# Patient Record
Sex: FEMALE | Race: WHITE | NOT HISPANIC OR LATINO | Employment: FULL TIME | URBAN - METROPOLITAN AREA
[De-identification: names, ages, dates, MRNs, and addresses within clinical notes are randomized per-mention and may not be internally consistent; named-entity substitution may affect disease eponyms.]

---

## 2017-01-23 ENCOUNTER — AMBULATORY - HEALTHEAST (OUTPATIENT)
Dept: FAMILY MEDICINE | Facility: CLINIC | Age: 37
End: 2017-01-23

## 2017-01-23 DIAGNOSIS — Z11.1 SCREENING FOR TUBERCULOSIS: ICD-10-CM

## 2017-01-24 ENCOUNTER — COMMUNICATION - HEALTHEAST (OUTPATIENT)
Dept: TELEHEALTH | Facility: CLINIC | Age: 37
End: 2017-01-24

## 2017-03-02 ENCOUNTER — COMMUNICATION - HEALTHEAST (OUTPATIENT)
Dept: TELEHEALTH | Facility: CLINIC | Age: 37
End: 2017-03-02

## 2017-03-02 ENCOUNTER — OFFICE VISIT - HEALTHEAST (OUTPATIENT)
Dept: FAMILY MEDICINE | Facility: CLINIC | Age: 37
End: 2017-03-02

## 2017-03-02 DIAGNOSIS — J40 BRONCHITIS: ICD-10-CM

## 2017-03-02 DIAGNOSIS — F17.200 SMOKER: ICD-10-CM

## 2017-03-02 ASSESSMENT — MIFFLIN-ST. JEOR: SCORE: 1992.65

## 2017-03-06 ENCOUNTER — COMMUNICATION - HEALTHEAST (OUTPATIENT)
Dept: FAMILY MEDICINE | Facility: CLINIC | Age: 37
End: 2017-03-06

## 2017-03-08 ENCOUNTER — OFFICE VISIT - HEALTHEAST (OUTPATIENT)
Dept: FAMILY MEDICINE | Facility: CLINIC | Age: 37
End: 2017-03-08

## 2017-03-08 ENCOUNTER — RECORDS - HEALTHEAST (OUTPATIENT)
Dept: ADMINISTRATIVE | Facility: OTHER | Age: 37
End: 2017-03-08

## 2017-03-08 ENCOUNTER — RECORDS - HEALTHEAST (OUTPATIENT)
Dept: GENERAL RADIOLOGY | Facility: CLINIC | Age: 37
End: 2017-03-08

## 2017-03-08 DIAGNOSIS — R05.9 COUGH: ICD-10-CM

## 2017-03-08 DIAGNOSIS — M54.9 DORSALGIA, UNSPECIFIED: ICD-10-CM

## 2017-03-08 DIAGNOSIS — M54.9 BACK PAIN: ICD-10-CM

## 2017-03-08 DIAGNOSIS — R31.9 HEMATURIA: ICD-10-CM

## 2017-03-08 LAB
CREAT SERPL-MCNC: 0.79 MG/DL (ref 0.6–1.1)
GFR SERPL CREATININE-BSD FRML MDRD: >60 ML/MIN/1.73M2

## 2017-03-08 ASSESSMENT — MIFFLIN-ST. JEOR: SCORE: 2015.33

## 2017-03-10 ENCOUNTER — COMMUNICATION - HEALTHEAST (OUTPATIENT)
Dept: FAMILY MEDICINE | Facility: CLINIC | Age: 37
End: 2017-03-10

## 2017-03-13 ENCOUNTER — OFFICE VISIT - HEALTHEAST (OUTPATIENT)
Dept: FAMILY MEDICINE | Facility: CLINIC | Age: 37
End: 2017-03-13

## 2017-03-13 DIAGNOSIS — R05.9 COUGH: ICD-10-CM

## 2017-03-14 ENCOUNTER — COMMUNICATION - HEALTHEAST (OUTPATIENT)
Dept: FAMILY MEDICINE | Facility: CLINIC | Age: 37
End: 2017-03-14

## 2017-03-16 ENCOUNTER — COMMUNICATION - HEALTHEAST (OUTPATIENT)
Dept: FAMILY MEDICINE | Facility: CLINIC | Age: 37
End: 2017-03-16

## 2017-03-16 ENCOUNTER — OFFICE VISIT - HEALTHEAST (OUTPATIENT)
Dept: FAMILY MEDICINE | Facility: CLINIC | Age: 37
End: 2017-03-16

## 2017-03-16 DIAGNOSIS — R31.9 BLOOD IN URINE: ICD-10-CM

## 2017-03-16 DIAGNOSIS — F17.200 NICOTINE DEPENDENCE: ICD-10-CM

## 2017-03-16 DIAGNOSIS — J20.9 ACUTE BRONCHITIS, UNSPECIFIED ORGANISM: ICD-10-CM

## 2017-03-16 ASSESSMENT — MIFFLIN-ST. JEOR: SCORE: 1997.18

## 2017-03-17 ENCOUNTER — COMMUNICATION - HEALTHEAST (OUTPATIENT)
Dept: FAMILY MEDICINE | Facility: CLINIC | Age: 37
End: 2017-03-17

## 2017-03-22 ENCOUNTER — RECORDS - HEALTHEAST (OUTPATIENT)
Dept: GENERAL RADIOLOGY | Facility: CLINIC | Age: 37
End: 2017-03-22

## 2017-03-22 ENCOUNTER — OFFICE VISIT - HEALTHEAST (OUTPATIENT)
Dept: FAMILY MEDICINE | Facility: CLINIC | Age: 37
End: 2017-03-22

## 2017-03-22 DIAGNOSIS — R05.9 COUGH: ICD-10-CM

## 2017-03-22 DIAGNOSIS — R53.83 FATIGUE: ICD-10-CM

## 2017-03-22 DIAGNOSIS — Z00.00 HEALTH CARE MAINTENANCE: ICD-10-CM

## 2017-03-22 DIAGNOSIS — M54.9 BACK PAIN: ICD-10-CM

## 2017-03-22 ASSESSMENT — MIFFLIN-ST. JEOR: SCORE: 2015.33

## 2017-03-23 ENCOUNTER — COMMUNICATION - HEALTHEAST (OUTPATIENT)
Dept: FAMILY MEDICINE | Facility: CLINIC | Age: 37
End: 2017-03-23

## 2017-03-23 ENCOUNTER — COMMUNICATION - HEALTHEAST (OUTPATIENT)
Dept: SCHEDULING | Facility: CLINIC | Age: 37
End: 2017-03-23

## 2017-03-28 ENCOUNTER — COMMUNICATION - HEALTHEAST (OUTPATIENT)
Dept: FAMILY MEDICINE | Facility: CLINIC | Age: 37
End: 2017-03-28

## 2017-03-30 ENCOUNTER — COMMUNICATION - HEALTHEAST (OUTPATIENT)
Dept: FAMILY MEDICINE | Facility: CLINIC | Age: 37
End: 2017-03-30

## 2017-04-08 ENCOUNTER — COMMUNICATION - HEALTHEAST (OUTPATIENT)
Dept: FAMILY MEDICINE | Facility: CLINIC | Age: 37
End: 2017-04-08

## 2017-04-10 ENCOUNTER — AMBULATORY - HEALTHEAST (OUTPATIENT)
Dept: FAMILY MEDICINE | Facility: CLINIC | Age: 37
End: 2017-04-10

## 2017-04-10 DIAGNOSIS — R93.89 ABNORMAL CHEST X-RAY: ICD-10-CM

## 2017-04-10 DIAGNOSIS — R05.9 COUGH: ICD-10-CM

## 2017-04-12 ENCOUNTER — AMBULATORY - HEALTHEAST (OUTPATIENT)
Dept: PULMONOLOGY | Facility: OTHER | Age: 37
End: 2017-04-12

## 2017-04-12 DIAGNOSIS — R05.9 COUGH: ICD-10-CM

## 2018-02-19 ENCOUNTER — COMMUNICATION - HEALTHEAST (OUTPATIENT)
Dept: FAMILY MEDICINE | Facility: CLINIC | Age: 38
End: 2018-02-19

## 2018-02-19 ENCOUNTER — OFFICE VISIT - HEALTHEAST (OUTPATIENT)
Dept: FAMILY MEDICINE | Facility: CLINIC | Age: 38
End: 2018-02-19

## 2018-02-19 DIAGNOSIS — G47.9 TROUBLE IN SLEEPING: ICD-10-CM

## 2018-02-19 DIAGNOSIS — F17.200 TOBACCO USE DISORDER: ICD-10-CM

## 2018-02-19 DIAGNOSIS — N89.8 VAGINAL IRRITATION: ICD-10-CM

## 2018-02-19 DIAGNOSIS — Z97.5 IUD (INTRAUTERINE DEVICE) IN PLACE: ICD-10-CM

## 2018-02-19 DIAGNOSIS — Z00.00 ANNUAL PHYSICAL EXAM: ICD-10-CM

## 2018-02-19 DIAGNOSIS — L29.9 ITCHY SKIN: ICD-10-CM

## 2018-02-19 DIAGNOSIS — E66.01 MORBID OBESITY (H): ICD-10-CM

## 2018-02-19 DIAGNOSIS — R53.83 FATIGUE: ICD-10-CM

## 2018-02-19 DIAGNOSIS — Z56.6 WORK STRESS: ICD-10-CM

## 2018-02-19 LAB
CHOLEST SERPL-MCNC: 116 MG/DL
CLUE CELLS: NORMAL
FASTING STATUS PATIENT QL REPORTED: YES
HBA1C MFR BLD: 5.4 % (ref 3.5–6)
HDLC SERPL-MCNC: 40 MG/DL
LDLC SERPL CALC-MCNC: 62 MG/DL
TRICHOMONAS, WET PREP: NORMAL
TRIGL SERPL-MCNC: 69 MG/DL
TSH SERPL DL<=0.005 MIU/L-ACNC: 2.25 UIU/ML (ref 0.3–5)
YEAST, WET PREP: NORMAL

## 2018-02-19 SDOH — HEALTH STABILITY - MENTAL HEALTH: OTHER PHYSICAL AND MENTAL STRAIN RELATED TO WORK: Z56.6

## 2018-02-19 ASSESSMENT — MIFFLIN-ST. JEOR: SCORE: 1996.61

## 2018-02-20 LAB
C TRACH DNA SPEC QL PROBE+SIG AMP: NEGATIVE
HPV SOURCE: NORMAL
HUMAN PAPILLOMA VIRUS 16 DNA: NEGATIVE
HUMAN PAPILLOMA VIRUS 18 DNA: NEGATIVE
HUMAN PAPILLOMA VIRUS FINAL DIAGNOSIS: NORMAL
HUMAN PAPILLOMA VIRUS OTHER HR: NEGATIVE
N GONORRHOEA DNA SPEC QL NAA+PROBE: NEGATIVE
SPECIMEN DESCRIPTION: NORMAL

## 2018-09-13 ENCOUNTER — COMMUNICATION - HEALTHEAST (OUTPATIENT)
Dept: TELEHEALTH | Facility: CLINIC | Age: 38
End: 2018-09-13

## 2018-09-13 ENCOUNTER — COMMUNICATION - HEALTHEAST (OUTPATIENT)
Dept: HEALTH INFORMATION MANAGEMENT | Facility: CLINIC | Age: 38
End: 2018-09-13

## 2018-09-17 ENCOUNTER — OFFICE VISIT - HEALTHEAST (OUTPATIENT)
Dept: FAMILY MEDICINE | Facility: CLINIC | Age: 38
End: 2018-09-17

## 2018-09-17 DIAGNOSIS — Z01.818 TUBAL LIGATION EVALUATION: ICD-10-CM

## 2018-09-17 DIAGNOSIS — Z01.818 PRE-OP EXAM: ICD-10-CM

## 2019-04-12 ENCOUNTER — OFFICE VISIT - HEALTHEAST (OUTPATIENT)
Dept: FAMILY MEDICINE | Facility: CLINIC | Age: 39
End: 2019-04-12

## 2019-04-12 ENCOUNTER — HOSPITAL ENCOUNTER (OUTPATIENT)
Dept: LAB | Age: 39
Setting detail: SPECIMEN
Discharge: HOME | End: 2019-04-12

## 2019-04-12 DIAGNOSIS — R00.2 PALPITATIONS: ICD-10-CM

## 2019-04-12 DIAGNOSIS — G47.19 EXCESSIVE DAYTIME SLEEPINESS: ICD-10-CM

## 2019-04-12 LAB
ALBUMIN SERPL-MCNC: 3.9 G/DL (ref 3.5–5)
ALP SERPL-CCNC: 82 U/L (ref 45–120)
ALT SERPL W P-5'-P-CCNC: 12 U/L (ref 0–45)
ANION GAP SERPL CALCULATED.3IONS-SCNC: 9 MMOL/L (ref 5–18)
AST SERPL W P-5'-P-CCNC: 11 U/L (ref 0–40)
BILIRUB SERPL-MCNC: 0.2 MG/DL (ref 0–1)
BUN SERPL-MCNC: 8 MG/DL (ref 8–22)
CALCIUM SERPL-MCNC: 9.2 MG/DL (ref 8.5–10.5)
CHLORIDE BLD-SCNC: 109 MMOL/L (ref 98–107)
CO2 SERPL-SCNC: 23 MMOL/L (ref 22–31)
CREAT SERPL-MCNC: 0.72 MG/DL (ref 0.6–1.1)
GFR SERPL CREATININE-BSD FRML MDRD: >60 ML/MIN/1.73M2
GLUCOSE BLD-MCNC: 84 MG/DL (ref 70–125)
IRON SATN MFR SERPL: 22 % (ref 20–50)
IRON SERPL-MCNC: 77 UG/DL (ref 42–175)
MAGNESIUM SERPL-MCNC: 2.3 MG/DL (ref 1.8–2.6)
POTASSIUM BLD-SCNC: 4.1 MMOL/L (ref 3.5–5)
PROT SERPL-MCNC: 7 G/DL (ref 6–8)
SODIUM SERPL-SCNC: 141 MMOL/L (ref 136–145)
TIBC SERPL-MCNC: 356 UG/DL (ref 313–563)
TRANSFERRIN SERPL-MCNC: 285 MG/DL (ref 212–360)
TSH SERPL DL<=0.005 MIU/L-ACNC: 1.61 UIU/ML (ref 0.3–5)
VIT B12 SERPL-MCNC: 256 PG/ML (ref 213–816)

## 2019-04-15 LAB
25(OH)D3 SERPL-MCNC: 14.6 NG/ML (ref 30–80)
25(OH)D3 SERPL-MCNC: 14.6 NG/ML (ref 30–80)
BASOPHILS # BLD AUTO: 0.1 THOU/UL (ref 0–0.2)
BASOPHILS NFR BLD AUTO: 1 % (ref 0–2)
EOSINOPHIL # BLD AUTO: 0.2 THOU/UL (ref 0–0.4)
EOSINOPHIL NFR BLD AUTO: 3 % (ref 0–6)
ERYTHROCYTE [DISTWIDTH] IN BLOOD BY AUTOMATED COUNT: 11.9 % (ref 11–14.5)
HCT VFR BLD AUTO: 43 % (ref 35–47)
HGB BLD-MCNC: 14.4 G/DL (ref 12–16)
LYMPHOCYTES # BLD AUTO: 2.5 THOU/UL (ref 0.8–4.4)
LYMPHOCYTES NFR BLD AUTO: 29 % (ref 20–40)
MCH RBC QN AUTO: 29.1 PG (ref 27–34)
MCHC RBC AUTO-ENTMCNC: 33.5 G/DL (ref 32–36)
MCV RBC AUTO: 87 FL (ref 80–100)
MONOCYTES # BLD AUTO: 0.7 THOU/UL (ref 0–0.9)
MONOCYTES NFR BLD AUTO: 8 % (ref 2–10)
NEUTROPHILS # BLD AUTO: 5.2 THOU/UL (ref 2–7.7)
NEUTROPHILS NFR BLD AUTO: 60 % (ref 50–70)
PLATELET # BLD AUTO: 253 THOU/UL (ref 140–440)
PMV BLD AUTO: 7.9 FL (ref 7–10)
RBC # BLD AUTO: 4.97 MILL/UL (ref 3.8–5.4)
WBC: 8.7 THOU/UL (ref 4–11)

## 2019-04-17 ENCOUNTER — HOSPITAL ENCOUNTER (OUTPATIENT)
Dept: CARDIOLOGY | Facility: CLINIC | Age: 39
Discharge: HOME | End: 2019-04-17
Attending: FAMILY MEDICINE

## 2019-04-17 DIAGNOSIS — R00.2 PALPITATIONS: ICD-10-CM

## 2019-09-11 ENCOUNTER — RECORDS - HEALTHEAST (OUTPATIENT)
Dept: ADMINISTRATIVE | Facility: OTHER | Age: 39
End: 2019-09-11

## 2019-09-28 ENCOUNTER — OFFICE VISIT - HEALTHEAST (OUTPATIENT)
Dept: FAMILY MEDICINE | Facility: CLINIC | Age: 39
End: 2019-09-28

## 2019-09-28 DIAGNOSIS — J01.00 ACUTE NON-RECURRENT MAXILLARY SINUSITIS: ICD-10-CM

## 2019-09-28 ASSESSMENT — MIFFLIN-ST. JEOR: SCORE: 1997.18

## 2019-10-04 ASSESSMENT — MIFFLIN-ST. JEOR
SCORE: 1997.18
SCORE: 1997.18

## 2019-10-08 ENCOUNTER — OFFICE VISIT - HEALTHEAST (OUTPATIENT)
Dept: FAMILY MEDICINE | Facility: CLINIC | Age: 39
End: 2019-10-08

## 2019-10-08 DIAGNOSIS — Z01.818 PREOP EXAMINATION: ICD-10-CM

## 2019-10-08 DIAGNOSIS — Z30.09 ENCOUNTER FOR OTHER GENERAL COUNSELING OR ADVICE ON CONTRACEPTION: ICD-10-CM

## 2019-10-08 DIAGNOSIS — E66.01 MORBID OBESITY (H): ICD-10-CM

## 2019-10-08 LAB
HCG SERPL-ACNC: <2 MLU/ML (ref 0–4)
HGB BLD-MCNC: 13.9 G/DL (ref 12–16)

## 2019-10-08 ASSESSMENT — MIFFLIN-ST. JEOR: SCORE: 1994.01

## 2019-10-09 ENCOUNTER — ANESTHESIA - HEALTHEAST (OUTPATIENT)
Dept: SURGERY | Facility: AMBULATORY SURGERY CENTER | Age: 39
End: 2019-10-09

## 2019-10-10 ENCOUNTER — SURGERY - HEALTHEAST (OUTPATIENT)
Dept: SURGERY | Facility: AMBULATORY SURGERY CENTER | Age: 39
End: 2019-10-10

## 2019-10-10 ENCOUNTER — HOSPITAL ENCOUNTER (OUTPATIENT)
Dept: SURGERY | Facility: AMBULATORY SURGERY CENTER | Age: 39
Discharge: HOME | End: 2019-10-10
Attending: OBSTETRICS & GYNECOLOGY | Admitting: OBSTETRICS & GYNECOLOGY

## 2019-10-10 DIAGNOSIS — Z30.2 ENCOUNTER FOR STERILIZATION: ICD-10-CM

## 2020-01-05 ENCOUNTER — OFFICE VISIT - HEALTHEAST (OUTPATIENT)
Dept: FAMILY MEDICINE | Facility: CLINIC | Age: 40
End: 2020-01-05

## 2020-01-05 DIAGNOSIS — B96.89 ACUTE BACTERIAL SINUSITIS: ICD-10-CM

## 2020-01-05 DIAGNOSIS — R05.9 COUGH: ICD-10-CM

## 2020-01-05 DIAGNOSIS — J01.90 ACUTE BACTERIAL SINUSITIS: ICD-10-CM

## 2020-04-03 ENCOUNTER — COMMUNICATION - HEALTHEAST (OUTPATIENT)
Dept: FAMILY MEDICINE | Facility: CLINIC | Age: 40
End: 2020-04-03

## 2020-04-03 DIAGNOSIS — J01.00 ACUTE NON-RECURRENT MAXILLARY SINUSITIS: ICD-10-CM

## 2020-09-11 ENCOUNTER — COMMUNICATION - HEALTHEAST (OUTPATIENT)
Dept: FAMILY MEDICINE | Facility: CLINIC | Age: 40
End: 2020-09-11

## 2020-09-11 DIAGNOSIS — J01.00 ACUTE NON-RECURRENT MAXILLARY SINUSITIS: ICD-10-CM

## 2020-11-05 ENCOUNTER — COMMUNICATION - HEALTHEAST (OUTPATIENT)
Dept: SCHEDULING | Facility: CLINIC | Age: 40
End: 2020-11-05

## 2020-11-09 ENCOUNTER — OFFICE VISIT - HEALTHEAST (OUTPATIENT)
Dept: FAMILY MEDICINE | Facility: CLINIC | Age: 40
End: 2020-11-09

## 2020-11-09 DIAGNOSIS — J01.00 ACUTE NON-RECURRENT MAXILLARY SINUSITIS: ICD-10-CM

## 2020-11-11 ENCOUNTER — COMMUNICATION - HEALTHEAST (OUTPATIENT)
Dept: FAMILY MEDICINE | Facility: CLINIC | Age: 40
End: 2020-11-11

## 2020-12-16 ENCOUNTER — COMMUNICATION - HEALTHEAST (OUTPATIENT)
Dept: SCHEDULING | Facility: CLINIC | Age: 40
End: 2020-12-16

## 2020-12-22 ENCOUNTER — OFFICE VISIT - HEALTHEAST (OUTPATIENT)
Dept: FAMILY MEDICINE | Facility: CLINIC | Age: 40
End: 2020-12-22

## 2020-12-22 DIAGNOSIS — M23.92 INTERNAL DERANGEMENT OF KNEE, LEFT: ICD-10-CM

## 2020-12-22 ASSESSMENT — MIFFLIN-ST. JEOR: SCORE: 1993.18

## 2020-12-28 ENCOUNTER — RECORDS - HEALTHEAST (OUTPATIENT)
Dept: ADMINISTRATIVE | Facility: OTHER | Age: 40
End: 2020-12-28

## 2021-05-27 NOTE — PROGRESS NOTES
ASSESSMENT/PLAN:  1. Palpitations  HM1(CBC and Differential)    Iron and Transferrin Iron Binding Capacity    Thyroid Stimulating Hormone (TSH)    Comprehensive Metabolic Panel    Vitamin D, Total (25-Hydroxy)    Vitamin B12    Ambulatory referral to Sleep Medicine    Holter Monitor    HM1 (CBC with Diff)    Magnesium   2. Excessive daytime sleepiness  Ambulatory referral to Sleep Medicine       This is a 39 yo female with   1.  Palpitations - there would be a broad differential for this - no acute symptoms that would require hospitalization at this time.  She may just be showing signs of stress/anxiety with increased work load/responsibility.  This may be cardiac in nature - or may represent underlying anemia, thyroid disease.  Will check labs as noted.  Ordered a Holter monitor as well  2.  Excessive daytime sleepiness - may contribute to #1 - will refer to Sleep Medicine for evaluation.   No follow-ups on file.      There are no discontinued medications.  There are no Patient Instructions on file for this visit.    Chief Complaint:  Chief Complaint   Patient presents with     Fluttering in chest and back     comes and goes x 4 days        HPI:   Sejal Ball is a 38 y.o. 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 been doing 2 months , but will be getting a new agency        PMH:   Patient Active Problem List    Diagnosis Date Noted     Mirena IUD (expires 10/2018) 02/20/2018     Female infertility associated with anovulation 02/19/2018     Herpes simplex type 2 infection 02/19/2018     Tobacco use disorder 02/19/2018     Morbid obesity (H) 02/19/2018     Trouble in sleeping 02/19/2018     Itchy skin - right breast 02/19/2018     GA (granuloma annulare) 08/11/2011     Past Medical History:   Diagnosis Date     Anovulation      Obesity      Tobacco abuse      Past Surgical History:   Procedure Laterality Date     CHOLECYSTECTOMY        Social History     Socioeconomic History     Marital status: Single     Spouse name: Not on file     Number of children: Not on file     Years of education: Not on file     Highest education level: Not on file   Occupational History     Not on file   Social Needs     Financial resource strain: Not on file     Food insecurity:     Worry: Not on file     Inability: Not on file     Transportation needs:     Medical: Not on file     Non-medical: Not on file   Tobacco Use     Smoking status: Current Every Day Smoker     Packs/day: 1.00     Years: 15.00     Pack years: 15.00     Types: Cigarettes     Smokeless tobacco: Never Used     Tobacco comment: half to almost a pack a day   Substance and Sexual Activity     Alcohol use: Not on file     Drug use: Not on file     Sexual activity: Not on file     Comment: Mirena   Lifestyle     Physical activity:     Days per week: Not on file     Minutes per session: Not on file     Stress: Not on file   Relationships     Social connections:     Talks on phone: Not on file     Gets together: Not on file     Attends Holiness service: Not on file     Active member of club or organization: Not on file     Attends meetings of clubs or organizations: Not on file     Relationship status: Not on file     Intimate partner violence:     Fear of current or ex partner: Not on file     Emotionally abused: Not on file     Physically abused: Not on file     Forced sexual activity: Not on file   Other Topics Concern     Not on file   Social History Narrative     Not on file     Family History   Problem Relation Age of Onset     Heart disease Mother      Prostate cancer Father      Hypertension Father        Meds:    Current Outpatient Medications:      levonorgestrel (MIRENA) 20 mcg/24 hr (5 years) IUD, 1 each by Intrauterine route once., Disp: , Rfl:      naproxen (NAPROSYN) 500 MG tablet, Take 500 mg by mouth., Disp: , Rfl:     Allergies:  Allergies   Allergen Reactions     [place] Oil Other  (See Comments)     Tongue tingling     Black [place] Hives     Other nuts do not bother patient     Penicillins Hives       ROS:  Pertinent positives as noted in HPI; otherwise 12 point ROS negative.      Physical Exam:  EXAM:  /78 (Patient Site: Right Arm, Patient Position: Sitting, Cuff Size: Adult Large)   Pulse 84   Temp 98.2  F (36.8  C) (Oral)   Resp 20   Wt (!) 282 lb 3 oz (128 kg)   LMP 04/08/2019 (Exact Date)   SpO2 98%   Breastfeeding? No   BMI 45.55 kg/m     Gen:  NAD, appears well, well-hydrated  HEENT:  TMs nl, oropharynx benign, nasal mucosa nl, conjunctiva clear  Neck:  Supple, no adenopathy, no thyromegaly, no carotid bruits, no JVD  Lungs:  Clear to auscultation bilaterally  Cor:  RRR no murmur  Abd:  Soft, nontender, BS+, no masses, no guarding or rebound, no HSM  Extr:  Neg.  Neuro:  No asymmetry  Skin:  Warm/dry        Results:  Results for orders placed or performed in visit on 04/12/19   Iron and Transferrin Iron Binding Capacity   Result Value Ref Range    Iron 77 42 - 175 ug/dL    Transferrin 285 212 - 360 mg/dL    Transferrin Saturation, Calculated 22 20 - 50 %    Transferrin IBC, Calculated 356 313 - 563 ug/dL   Thyroid Stimulating Hormone (TSH)   Result Value Ref Range    TSH 1.61 0.30 - 5.00 uIU/mL   Comprehensive Metabolic Panel   Result Value Ref Range    Sodium 141 136 - 145 mmol/L    Potassium 4.1 3.5 - 5.0 mmol/L    Chloride 109 (H) 98 - 107 mmol/L    CO2 23 22 - 31 mmol/L    Anion Gap, Calculation 9 5 - 18 mmol/L    Glucose 84 70 - 125 mg/dL    BUN 8 8 - 22 mg/dL    Creatinine 0.72 0.60 - 1.10 mg/dL    GFR MDRD Af Amer >60 >60 mL/min/1.73m2    GFR MDRD Non Af Amer >60 >60 mL/min/1.73m2    Bilirubin, Total 0.2 0.0 - 1.0 mg/dL    Calcium 9.2 8.5 - 10.5 mg/dL    Protein, Total 7.0 6.0 - 8.0 g/dL    Albumin 3.9 3.5 - 5.0 g/dL    Alkaline Phosphatase 82 45 - 120 U/L    AST 11 0 - 40 U/L    ALT 12 0 - 45 U/L   Vitamin D, Total (25-Hydroxy)   Result Value Ref Range    Vitamin  D, Total (25-Hydroxy) 14.6 (L) 30.0 - 80.0 ng/mL   Vitamin B12   Result Value Ref Range    Vitamin B-12 256 213 - 816 pg/mL   HM1 (CBC with Diff)   Result Value Ref Range    WBC 8.7 4.0 - 11.0 thou/uL    RBC 4.97 3.80 - 5.40 mill/uL    Hemoglobin 14.4 12.0 - 16.0 g/dL    Hematocrit 43.0 35.0 - 47.0 %    MCV 87 80 - 100 fL    MCH 29.1 27.0 - 34.0 pg    MCHC 33.5 32.0 - 36.0 g/dL    RDW 11.9 11.0 - 14.5 %    Platelets 253 140 - 440 thou/uL    MPV 7.9 7.0 - 10.0 fL    Neutrophils % 60 50 - 70 %    Lymphocytes % 29 20 - 40 %    Monocytes % 8 2 - 10 %    Eosinophils % 3 0 - 6 %    Basophils % 1 0 - 2 %    Neutrophils Absolute 5.2 2.0 - 7.7 thou/uL    Lymphocytes Absolute 2.5 0.8 - 4.4 thou/uL    Monocytes Absolute 0.7 0.0 - 0.9 thou/uL    Eosinophils Absolute 0.2 0.0 - 0.4 thou/uL    Basophils Absolute 0.1 0.0 - 0.2 thou/uL   Magnesium   Result Value Ref Range    Magnesium 2.3 1.8 - 2.6 mg/dL

## 2021-05-30 VITALS — WEIGHT: 290 LBS | BODY MASS INDEX: 46.61 KG/M2 | HEIGHT: 66 IN

## 2021-05-30 VITALS — WEIGHT: 290 LBS | BODY MASS INDEX: 46.81 KG/M2

## 2021-05-30 VITALS — BODY MASS INDEX: 47.09 KG/M2 | HEIGHT: 66 IN | WEIGHT: 293 LBS

## 2021-05-30 VITALS — HEIGHT: 66 IN | WEIGHT: 293 LBS | BODY MASS INDEX: 47.09 KG/M2

## 2021-05-30 VITALS — BODY MASS INDEX: 46.45 KG/M2 | HEIGHT: 66 IN | WEIGHT: 289 LBS

## 2021-06-01 VITALS — HEIGHT: 66 IN | WEIGHT: 289.88 LBS | BODY MASS INDEX: 46.59 KG/M2

## 2021-06-01 NOTE — PROGRESS NOTES
Chief Complaint   Patient presents with     Cough     vomitted yesterday, phlegm. X1 week     Sinus Problem     pressure        HPI:  Sejal Ball is a 39 y.o. female who presents today complaining of cough and sinus congestion x 1 week. She has been taking Ibuprofen for HA.  Her headache has been secondary to cough.  She vomited once yesterday secondary to coughing.  The vomit was all mucus.  Her nasal congestion mucus and productive cough has been yellow sputum.  She denies any wheezing, ear pain, fever, or abdominal pain.  She has had maxillary sinus pressure bilaterally for the past couple of days.  She was experiencing chills and sweats last night, but she did not measure to see if she had a fever.  She has not been taking any cough suppressants for her cough.    History obtained from the patient.    Problem List:  2018-02: Mirena IUD (expires 10/2018)  2018-02: Female infertility associated with anovulation  2018-02: Herpes simplex type 2 infection  2018-02: Tobacco use disorder  2018-02: Morbid obesity (H)  2018-02: Trouble in sleeping  2018-02: Itchy skin - right breast  2011-08: GA (granuloma annulare)      Past Medical History:   Diagnosis Date     Anovulation      Obesity      Tobacco abuse        Social History     Tobacco Use     Smoking status: Current Every Day Smoker     Packs/day: 1.00     Years: 15.00     Pack years: 15.00     Types: Cigarettes     Smokeless tobacco: Never Used     Tobacco comment: half to almost a pack a day   Substance Use Topics     Alcohol use: Not on file       Review of Systems   Constitutional: Positive for chills and diaphoresis. Negative for fever.   HENT: Positive for congestion, rhinorrhea, sinus pressure and sinus pain. Negative for ear pain.    Respiratory: Positive for cough. Negative for wheezing.    Gastrointestinal: Positive for vomiting (once yesterday, secondary from coughing). Negative for abdominal pain.   Neurological: Positive for headaches (secondary to  "cough).       Vitals:    09/28/19 1309   BP: 137/83   Patient Site: Right Arm   Patient Position: Sitting   Cuff Size: Adult Large   Pulse: 86   Resp: 20   Temp: 98.3  F (36.8  C)   TempSrc: Oral   SpO2: 99%   Weight: (!) 290 lb (131.5 kg)   Height: 5' 6\" (1.676 m)       Physical Exam  Constitutional:       General: She is not in acute distress.     Appearance: She is well-developed. She is not diaphoretic.   HENT:      Head: Normocephalic and atraumatic.      Right Ear: Tympanic membrane, ear canal and external ear normal.      Left Ear: Tympanic membrane, ear canal and external ear normal.      Nose:      Right Sinus: Maxillary sinus tenderness present. No frontal sinus tenderness.      Left Sinus: Maxillary sinus tenderness present. No frontal sinus tenderness.      Mouth/Throat:      Mouth: Mucous membranes are moist.   Eyes:      General:         Right eye: No discharge.         Left eye: No discharge.      Conjunctiva/sclera: Conjunctivae normal.   Cardiovascular:      Rate and Rhythm: Normal rate and regular rhythm.      Heart sounds: Normal heart sounds.   Pulmonary:      Effort: Pulmonary effort is normal. No respiratory distress.      Breath sounds: Normal breath sounds. No wheezing.   Psychiatric:         Behavior: Behavior normal.         Thought Content: Thought content normal.         Judgement: Judgment normal.           Clinical Decision Making:  Given the patient's increased productivity and purulence of her nasal discharge and correlation with maxillary sinus pain I suspect the patient may have bacterial sinusitis.  Patient was started on doxycycline for treatment.  Suspect that she also may have bronchitis as she is having any coughing spells.  Patient was treated with albuterol and benzonatate.  At the end of the encounter, I discussed results, diagnosis, medications. Discussed red flags for immediate return to clinic/ER, as well as indications for follow up if no improvement. Patient understood " and agreed to plan. Patient was stable for discharge.    1. Acute non-recurrent maxillary sinusitis  doxycycline (MONODOX) 100 MG capsule    benzonatate (TESSALON) 200 MG capsule    albuterol (PROAIR HFA;PROVENTIL HFA;VENTOLIN HFA) 90 mcg/actuation inhaler         Patient Instructions   1.  Take antibiotic according to bottle instructions with food to avoid stomach upset.  If you are prone to stomach upset with antibiotics, I recommend adding a probiotic to this regimen.  Culturelle is a trusted brand.  2.  I recommend using Mucinex to help thin mucus secretions.  Adding a saline nasal spray can also be helpful with clearing sinus congestion.  3.  Take Advil or Tylenol as needed for pain or fever control.  4.  Follow-up if you not having any improvement in your symptoms over the next 5 days.

## 2021-06-01 NOTE — PATIENT INSTRUCTIONS - HE
1.  Take antibiotic according to bottle instructions with food to avoid stomach upset.  If you are prone to stomach upset with antibiotics, I recommend adding a probiotic to this regimen.  Culturelle is a trusted brand.  2.  I recommend using Mucinex to help thin mucus secretions.  Adding a saline nasal spray can also be helpful with clearing sinus congestion.  3.  Take Advil or Tylenol as needed for pain or fever control.  4.  Follow-up if you not having any improvement in your symptoms over the next 5 days.

## 2021-06-02 VITALS — WEIGHT: 282.12 LBS | BODY MASS INDEX: 45.54 KG/M2

## 2021-06-02 NOTE — H&P
Updated H&P    History and Physical Update    I have examined the patient and reviewed the history and physical that is present on this chart. The changes in the patient's history and physical condition are as follows: None      Angela Means

## 2021-06-02 NOTE — PROGRESS NOTES
Preoperative Exam    Scheduled Procedure: laparoscopic bilateral salpingectomy (tubal ligation)  Surgery Date:  10/10/19  Surgery Location: [facility], fax [phone]    Surgeon:  Dr Means    Assessment/Plan:     1. Preop examination  Beta-hCG, Quantitative    Hemoglobin   2. Encounter for other general counseling or advice on contraception     3. Morbid obesity (H)       This is a 40 yo female here for preop assessment prior to planned tubal ligation.  She has had previous Mirena IUD and reports irregular heavy bleeding with this.  Has opted for surgical method of sterilization.  Reviewed labs - OK for surgery.  Has underlying risk factor of morbid obesity - discussed.        Surgical Procedure Risk: Low (reported cardiac risk generally < 1%)  Have you had prior anesthesia?: Yes  Have you or any family members had a previous anesthesia reaction:  Yes: needs inhaler  Do you or any family members have a history of a clotting or bleeding disorder?: No  Cardiac Risk Assessment: no increased risk for major cardiac complications    APPROVAL GIVEN to proceed with proposed procedure, without further diagnostic evaluation    Please Note:  no special considerations.    Functional Status: Independent  Patient plans to recover at home with family.     Subjective:      Sejal Ball is a 39 y.o. female who presents for a preoperative consultation.      Desires tubal ligation -   Has had Mirena - lighter periods, sometimes 7 days total ;       All other systems reviewed and are negative, other than those listed in the HPI.    Pertinent History  Do you have difficulty breathing or chest pain after walking up a flight of stairs: No  History of obstructive sleep apnea: No  Steroid use in the last 6 months: No  Frequent Aspirin/NSAID use: No  Prior Blood Transfusion: No  Prior Blood Transfusion Reaction: No  If for some reason prior to, during or after the procedure, if it is medically indicated, would you be  willing to have a blood transfusion?:  There is no transfusion refusal.    Current Outpatient Medications   Medication Sig Dispense Refill     albuterol (PROAIR HFA;PROVENTIL HFA;VENTOLIN HFA) 90 mcg/actuation inhaler Inhale 2 puffs every 6 (six) hours as needed for wheezing. 1 each 0     benzonatate (TESSALON) 200 MG capsule Take 1 capsule (200 mg total) by mouth 3 (three) times a day as needed. 21 capsule 0     doxycycline (MONODOX) 100 MG capsule Take 1 capsule (100 mg total) by mouth 2 (two) times a day for 10 days. 20 capsule 0     IBUPROFEN ORAL Take by mouth.       levonorgestrel (MIRENA) 20 mcg/24 hr (5 years) IUD 1 each by Intrauterine route once.       naproxen (NAPROSYN) 500 MG tablet Take 500 mg by mouth.       No current facility-administered medications for this visit.         Allergies   Allergen Reactions     [place] Oil Other (See Comments)     Tongue tingling     Black [place] Hives     Other nuts do not bother patient     Penicillins Hives       Patient Active Problem List   Diagnosis     Female infertility associated with anovulation     GA (granuloma annulare)     Herpes simplex type 2 infection     Tobacco use disorder     Morbid obesity (H)     Trouble in sleeping     Itchy skin - right breast     Mirena IUD (expires 10/2018)     Contraception management       Past Medical History:   Diagnosis Date     Anovulation      History of anesthesia complications     Some gasping after gallbaldder surgery     Obesity      Tobacco abuse        Past Surgical History:   Procedure Laterality Date     CHOLECYSTECTOMY         Social History     Socioeconomic History     Marital status: Single     Spouse name: Not on file     Number of children: Not on file     Years of education: Not on file     Highest education level: Not on file   Occupational History     Not on file   Social Needs     Financial resource strain: Not on file     Food insecurity:     Worry: Not on file     Inability: Not on file      "Transportation needs:     Medical: Not on file     Non-medical: Not on file   Tobacco Use     Smoking status: Current Every Day Smoker     Packs/day: 1.00     Years: 15.00     Pack years: 15.00     Types: Cigarettes     Smokeless tobacco: Never Used     Tobacco comment: half to almost a pack a day   Substance and Sexual Activity     Alcohol use: Never     Frequency: Never     Drug use: Never     Sexual activity: Not on file     Comment: Mirena   Lifestyle     Physical activity:     Days per week: Not on file     Minutes per session: Not on file     Stress: Not on file   Relationships     Social connections:     Talks on phone: Not on file     Gets together: Not on file     Attends Religion service: Not on file     Active member of club or organization: Not on file     Attends meetings of clubs or organizations: Not on file     Relationship status: Not on file     Intimate partner violence:     Fear of current or ex partner: Not on file     Emotionally abused: Not on file     Physically abused: Not on file     Forced sexual activity: Not on file   Other Topics Concern     Not on file   Social History Narrative     Not on file       Patient Care Team:  Annie Vazquez MD as PCP - General (Family Medicine)  Annie Vazquez MD as Assigned PCP          Objective:     Vitals:    10/08/19 1625   BP: 110/60   Pulse: 77   Resp: 16   Temp: 98.1  F (36.7  C)   TempSrc: Oral   Weight: (!) 289 lb 4.8 oz (131.2 kg)   Height: 5' 6\" (1.676 m)   LMP: 09/22/2019         Physical Exam:  Physical Exam   EXAM:  /60 (Patient Site: Right Arm, Patient Position: Sitting, Cuff Size: Adult Large)   Pulse 77   Temp 98.1  F (36.7  C) (Oral)   Resp 16   Ht 5' 6\" (1.676 m)   Wt (!) 289 lb 4.8 oz (131.2 kg)   LMP 09/22/2019   Breastfeeding? No   BMI 46.69 kg/m     Gen:  NAD, appears well, well-hydrated, obese  HEENT:  TMs nl, oropharynx benign, nasal mucosa nl, conjunctiva clear  Neck:  Supple, no " adenopathy, no thyromegaly, no carotid bruits, no JVD  Lungs:  Clear to auscultation bilaterally  Cor:  RRR no murmur  Abd:  Soft, nontender, BS+, no masses, no guarding or rebound, no HSM  Extr:  Neg.  Neuro:  No asymmetry  Skin:  Warm/dry        There are no Patient Instructions on file for this visit.        Labs:  Recent Results (from the past 24 hour(s))   Hemoglobin    Collection Time: 10/08/19  5:05 PM   Result Value Ref Range    Hemoglobin 13.9 12.0 - 16.0 g/dL     Pregnancy test - pending 10/8/19    Immunization History   Administered Date(s) Administered     DTP 1980, 1980, 1980, 1980, 11/01/1981     Influenza, inj, historic,unspecified 10/11/2010, 10/10/2011, 10/19/2012, 10/17/2013, 10/13/2014, 10/11/2015, 10/25/2016     Influenza,inj,MDCK,PF,Quad >4yrs 10/09/2017     MMR 10/24/1981, 08/19/1992     OPV,Trivalent,Historic(3126-1521 only) 1980, 1980, 11/01/1981, 08/01/1985     Tdap 07/11/2009           Electronically signed by Annie Vazquez MD 10/08/19 4:17 PM

## 2021-06-02 NOTE — OP NOTE
Operative Note    Name:  Sejal Ball  Location: [facility]  Procedure Date:  10/10/2019  PCP:  Annie Vazquez MD      Laparoscopic bilateral salpingectomy, left ovarian cystectomy, lysis of adhesions    Pre-Procedure Diagnosis:  Encounter for sterilization [Z30.2]     Post-Procedure Diagnosis:    Same as preop with left ovarian cyst    Surgeon(s):  Angela Means MD    No Physician Assistant or First Assist has been documented in procedure    Anesthesia Type:  General    Findings:  Left ovarian cyst with adhesions to anterior abdominal wall (approx 3cm)    Complications:    None    Specimens:    ID Type Source Tests Collected by Time   A :  Tissue Fallopian Tube, Right SURGICAL PATHOLOGY EXAM Angela Means MD 10/10/2019 1030   B :  Tissue Fallopian Tube, Left SURGICAL PATHOLOGY EXAM Angela Means MD 10/10/2019 1030   D : CYST WALL Tissue Ovary, Cyst, Left SURGICAL PATHOLOGY EXAM Angela Means MD 10/10/2019 1036          Lines, Drains, Airways:  Urethral Catheter Non-latex 16 Fr. (Active)       Implants:  * No implants in log *    Estimated Blood Loss:   20ml    DESCRIPTION OF THE PROCEDURE:   The patient was taken to the operating room in stable condition. She was prepped and draped in the normal sterile fashion for a vaginal and abdominal procedure.  A timeout was performed.    A Ochoa catheter was placed.  A sterile bivalve speculum was placed in the vagina.  A single-tooth tenaculum was used to grasp the anterior lip of the cervix.  The cervix was dilated to a #6 Hegar dilator.  A hysteroscope was placed into the uterine cavity.  Uterine cavity was examined and noted to be within normal limits as well as bilateral tubal ostia.  The hysteroscope was then removed.  A sharp curette was placed into the uterine cavity and all 4 quadrants of the uterine cavity were sharply curetted.  Endometrial curettings were sent to pathology.  A uterine sound was placed into the  uterine cavity and rubber banded to the single-tooth tenaculum to be used as uterine probe.  The speculum was removed from the vagina.  Gloves and gown were changed and attention was then turned to the abdomen.    An umbilical incision was made 5 mm in length.  A Veress needle was introduced into the abdomen with the 2 pop technique and a saline drop test confirmed adequate placement and the abdomen.  An opening pressure of 4mm Hg was noted.  The abdomen was insufflated to 15 mmHg.  The Veress needle was removed.  A 5 mm non-bladed trocar was placed into the incision under direct visualization with the camera and the port during time of placement.  Intra-abdominal placement of the trocar was confirmed.  Left and right lower quadrant 5 mm ports were placed under direct visualization.  The uterus and ovaries and tubes were evaluated and noted to be densely adhesed from left ovary with cyst to anterior abdominal wall, normal right tube and ovary.      The left ovary was taken down from its adhesions sharply and bleeding was controlled with ligasure cautery.  The left ovary cyst was drained using irrigation suction.  The cyst wall was then incised using the laparoscopic scissors.   The cyst wall was bluntly removed using prestige graspers.  Small amounts of oozing from the cyst wall were controlled using 5 g of Sandra.  The left and right fallopian tubes were visualized and cautery ligated and removed using ligasure.    The abdomen was deinsufflated to 2 mmHg and the cyst was evaluated.  Excellent hemostasis was noted at this point.  30 mL of quarter percent Marcaine were instilled into the abdomen.  The abdomen was inspected and noted be within normal limits.  All trochars were removed and the abdomen was completely deinsufflated.  The incisions were closed using 4-0 Monocryl in subcuticular fashion.  Steri-Strips were placed over the incisions.  At this point the procedure was terminated.  The patient tolerated the  procedure well.  Lap and needle counts correct ×2.  The patient was taken to the recovery room in stable condition.    Angela Means M.D.    Date: 10/10/2019  Time: 11:19 AM

## 2021-06-02 NOTE — ANESTHESIA CARE TRANSFER NOTE
Last vitals:   Vitals:    10/10/19 1102   BP: 135/80   Pulse: 76   Resp: 16   Temp: 37  C (98.6  F)   SpO2: 100%     Patient spontaneous RR, TV 400s, suctioned, following commands, extubated to facemask 10LPM, O2 sats 100%. VSS. Report to RN.    Patient's level of consciousness is drowsy  Spontaneous respirations: yes  Maintains airway independently: yes  Dentition unchanged: yes  Oropharynx: oropharynx clear of all foreign objects    QCDR Measures:  ASA# 20 - Surgical Safety Checklist: WHO surgical safety checklist completed prior to induction    PQRS# 430 - Adult PONV Prevention: 4558F - Pt received => 2 anti-emetic agents (different classes) preop & intraop  ASA# 8 - Peds PONV Prevention: NA - Not pediatric patient, not GA or 2 or more risk factors NOT present  PQRS# 424 - Sujatha-op Temp Management: 4559F - At least one body temp DOCUMENTED => 35.5C or 95.9F within required timeframe  PQRS# 426 - PACU Transfer Protocol: - Transfer of care checklist used  ASA# 14 - Acute Post-op Pain: ASA14B - Patient did NOT experience pain >= 7 out of 10

## 2021-06-02 NOTE — ANESTHESIA POSTPROCEDURE EVALUATION
Patient: Sejal Ball  LAPAROSCOPIC BILATERAL SALPINGECTOMY, LEFT OVARIAN CYSTECTOMY  Anesthesia type: general    Patient location: Phase II Recovery  Last vitals:   Vitals Value Taken Time   /61 10/10/2019 12:30 PM   Temp 36.4  C (97.5  F) 10/10/2019 11:51 AM   Pulse 70 10/10/2019 12:31 PM   Resp 16 10/10/2019 12:30 PM   SpO2 94 % 10/10/2019 12:31 PM   Vitals shown include unvalidated device data.  Post vital signs: stable  Level of consciousness: awake and responds to simple questions  Post-anesthesia pain: pain controlled  Post-anesthesia nausea and vomiting: no  Pulmonary: unassisted, return to baseline  Cardiovascular: stable and blood pressure at baseline  Hydration: adequate  Anesthetic events: no    QCDR Measures:  ASA# 11 - Sujatha-op Cardiac Arrest: ASA11B - Patient did NOT experience unanticipated cardiac arrest  ASA# 12 - Sujatha-op Mortality Rate: ASA12B - Patient did NOT die  ASA# 13 - PACU Re-Intubation Rate: ASA13B - Patient did NOT require a new airway mgmt  ASA# 10 - Composite Anes Safety: ASA10A - No serious adverse event    Additional Notes:

## 2021-06-02 NOTE — ANESTHESIA PREPROCEDURE EVALUATION
Anesthesia Evaluation      Patient summary reviewed   No history of anesthetic complications     Airway   Mallampati: II   Pulmonary - normal exam   (+) recent URI resolved, a smoker (1ppd for 20 years)                         Cardiovascular - normal exam  Exercise tolerance: > or = 4 METS  Rhythm: regular  Rate: normal,         Neuro/Psych - negative ROS     Endo/Other    (+) obesity,      GI/Hepatic/Renal - negative ROS           Dental - normal exam                        Anesthesia Plan  Planned anesthetic: general endotracheal  GAETT  Scopolamine for PONV  Antiemetics  Tylenol po pre op  Toradol at the end of case if okay with surgeon  Consider background propofol  Soft bite block  ASA 3   Induction: intravenous   Anesthetic plan and risks discussed with: patient    Post-op plan: routine recovery

## 2021-06-03 VITALS
WEIGHT: 290 LBS | BODY MASS INDEX: 46.61 KG/M2 | HEIGHT: 66 IN | WEIGHT: 290 LBS | BODY MASS INDEX: 46.61 KG/M2 | HEIGHT: 66 IN

## 2021-06-03 VITALS
WEIGHT: 290 LBS | RESPIRATION RATE: 20 BRPM | HEIGHT: 66 IN | BODY MASS INDEX: 46.61 KG/M2 | HEART RATE: 86 BPM | SYSTOLIC BLOOD PRESSURE: 137 MMHG | TEMPERATURE: 98.3 F | DIASTOLIC BLOOD PRESSURE: 83 MMHG | OXYGEN SATURATION: 99 %

## 2021-06-03 VITALS
HEART RATE: 77 BPM | TEMPERATURE: 98.1 F | DIASTOLIC BLOOD PRESSURE: 60 MMHG | RESPIRATION RATE: 16 BRPM | WEIGHT: 289.3 LBS | HEIGHT: 66 IN | BODY MASS INDEX: 46.49 KG/M2 | SYSTOLIC BLOOD PRESSURE: 110 MMHG

## 2021-06-03 VITALS — BODY MASS INDEX: 45.55 KG/M2 | WEIGHT: 282.19 LBS

## 2021-06-04 VITALS
WEIGHT: 288 LBS | DIASTOLIC BLOOD PRESSURE: 73 MMHG | OXYGEN SATURATION: 99 % | RESPIRATION RATE: 18 BRPM | BODY MASS INDEX: 46.48 KG/M2 | HEART RATE: 86 BPM | SYSTOLIC BLOOD PRESSURE: 122 MMHG | TEMPERATURE: 98.2 F

## 2021-06-05 VITALS
DIASTOLIC BLOOD PRESSURE: 77 MMHG | HEIGHT: 66 IN | BODY MASS INDEX: 46.61 KG/M2 | WEIGHT: 290 LBS | TEMPERATURE: 98 F | HEART RATE: 69 BPM | SYSTOLIC BLOOD PRESSURE: 147 MMHG | RESPIRATION RATE: 18 BRPM

## 2021-06-07 NOTE — TELEPHONE ENCOUNTER
RN cannot approve Refill Request    RN can NOT refill this medication question if med is meant for long term use.     Ordered by [facility]      Beena Graff, [facility] Triage/Med Refill 4/3/2020    Requested Prescriptions   Pending Prescriptions Disp Refills     albuterol (PROAIR HFA;PROVENTIL HFA;VENTOLIN HFA) 90 mcg/actuation inhaler 3 each 3     Sig: Inhale 2 puffs every 6 (six) hours as needed for wheezing.       Albuterol/Levalbuterol Refill Protocol Passed - 4/3/2020 11:57 AM        Passed - PCP or prescribing provider visit in last year     Last office visit with prescriber/PCP: 4/12/2019 Annie Vazquez MD OR same dept: 4/12/2019 Annie Vazquez MD OR same specialty: 4/12/2019 Annie Vazquez MD Last physical: 10/8/2019       Next appt within 3 mo: Visit date not found  Next physical within 3 mo: Visit date not found  Prescriber OR PCP: Annie Vazquez MD  Last diagnosis associated with med order: 1. Acute non-recurrent maxillary sinusitis  - albuterol (PROAIR HFA;PROVENTIL HFA;VENTOLIN HFA) 90 mcg/actuation inhaler; Inhale 2 puffs every 6 (six) hours as needed for wheezing.  Dispense: 3 each; Refill: 3    If protocol passes may refill for 6 months if within 3 months of last provider visit (or a total of 9 months). If patient requesting >1 inhaler per month refill x 6 months and have patient make appointment with provider.

## 2021-06-07 NOTE — TELEPHONE ENCOUNTER
Who is calling:  Pharmacy    Reason for Call:  Pharmacy states patient is out of this med and needs refills    Date of last appointment with primary care: 10/08/2019    Okay to leave a detailed message: Yes

## 2021-06-09 NOTE — PROGRESS NOTES
Subjective:    Sejal Ball is a 36 y.o. female who presents for evaluation of cough.  She has been seen a few times already for this cough.  [facility] and [facility]ine for 10 days.  This was around the end of February.  She then saw Dr. Reynoso for visit on 3/2/17.  I reviewed that note today.  She was in the middle of her doxycycline treatment at that point.  She was given a 5 day course of prednisone and also advised to try Mucinex.  Patient did take both of these as directed.  The Mucinex does help her sleep better at night.  Prednisone did seem to help with the cough somewhat.  However, cough persisted.  She saw Dr. Wills for follow-up on 3/8/2017.  Chest x-ray at that visit was grossly normal.  I reviewed visit note and x-ray report.  Yesterday, she started coughing up a small amount of blood mixed in with phlegm.  This was concerning to her, and she wants to get it checked out.  She thinks that overall her cough might be getting a little better, but definitely is still present.  She is a smoker.  She notes that she initially was having fevers with her cough, but those have resolved completely.  She and her  both smoke, and both smoke inside the house.  She has never used an inhaler before.    Current Outpatient Prescriptions:      guaiFENesin ER (MUCINEX) 600 mg 12 hr tablet, Take 1 tablet (600 mg total) by mouth 2 (two) times a day for 10 days., Disp: 20 tablet, Rfl: 0     levonorgestrel (MIRENA) 20 mcg/24 hr (5 years) IUD, 1 each by Intrauterine route once., Disp: , Rfl:      doxycycline (VIBRA-TABS) 100 MG tablet, , Disp: , Rfl:      predniSONE (DELTASONE) 10 MG tablet, Take 4 tablets (40 mg total) by mouth daily for 5 days., Disp: 20 tablet, Rfl: 0     Objective:   Allergies:  Cammal oil; Black walnut; and Penicillins    Vitals:  Vitals:    03/13/17 0926   BP: 122/68   Pulse: 83   Resp: 18   Temp: 97.6  F (36.4  C)   SpO2: 97%     Body mass index is 46.81  kg/(m^2).    Vital signs reviewed.  General: Patient is alert and oriented x 3, in no apparent distress  Ears: TMs are non-erythematous with good light reflex bilaterally  Throat: no erythema, edema or exudate noted  Lymphatic: no anterior cervical lymph node enlargement  Cardiac: regular rate and rhythm, no murmurs  Pulmonary: lungs clear to auscultation bilaterally, no crackles, rales, rhonchi, or wheezing noted    Assessment and Plan:   1.  Cough.  Ongoing cough for approximately 3 weeks.  She has been treated with doxycycline for 10 days and prednisone for 5 days.  She notes that prednisone did seem to help somewhat with the cough.  Mucinex also helps somewhat.  Chest x-ray was grossly normal on 3/8/2017.  Prescription sent for one more 5 day course of prednisone.  If this is not helpful, she will return to see PCP next week for follow-up.  I also reviewed that smoking may be contributing to her cough.    This dictation uses voice recognition software, which may contain typographical errors.

## 2021-06-09 NOTE — PROGRESS NOTES
Assessment/ Plan  1. Back pain  Likely musculoskeletal    2. Cough  Likely postviral although there continues to be a density in the lateral view of the x-ray which the radiologist commented on last time- they thought there was a normal variant since it wasn't visible in the AP view.  Will await radiology over read this time.  - XR Chest PA and Lateral; Future    3. Fatigue  Probably post viral fatigue, continue to follow    Patient has been out of work for the month today.  Discussed that I would excuse her for ongoing illness 3/25, asked her to return to work on 3/27.  Awaiting short-term disability forms to be sent for this.      Subjective  CC:  Chief Complaint   Patient presents with     Back Pain     follow up     Cough     HPI:  Tired  Chills  Not eating well  Coughing hard- one time with couking    -------------------------------    Mild lingering cough and bringing up phlegm    More water    Back pain  Narrativemid back pain  ---------------------  Duration/timing- since   Location/ radiation left sinde  Quality/Severity-moderate  Context/modifying factors-moving, not coughing  Red flags- progressive weakness, weight loss/ fever, night-time awakening?- No  Back pain history  previous evaluation, treatment, imaging? - Not before this, previously evaluated.  I was concerned about back pain plus hematuria, CT scan done  Comments-   No sob  Fatigue    Narrative-  Since .  ___________________________  Notes  Duration/ Timing/ context-since she became ill, late February.  2 doxycycline and prednisone at that time  Sleepy yes, difficulty getting started doing things? Yes fatigue encountered when actually doing them? Not as much  Urinating more in than normal    PFSH:  Current medications reviewed as follows:  Current Outpatient Prescriptions on File Prior to Visit   Medication Sig     levonorgestrel (MIRENA) 20 mcg/24 hr (5 years) IUD 1 each by Intrauterine route once.     []  "azithromycin (ZITHROMAX) 250 MG tablet Two on day one.   One daily four days     No current facility-administered medications on file prior to visit.      There are no active problems to display for this patient.    History   Smoking Status     Current Every Day Smoker     Packs/day: 1.00     Years: 15.00     Types: Cigarettes   Smokeless Tobacco     Not on file     Comment: half to almost a pack a day     Social History     Social History Narrative     Patient Care Team:  Peyman Reynoso MD as PCP - General (Family Medicine)  ROS  Full 10 system review including constitutional, respiratory, cardiac, gi, urinary, rheumatologic, neurologic, reproductive, dermatologic psychiatric is  performed (via questionnaire) and is negative except fever, chills, fatigue, frequent urination, back pain      Objective  Physical Exam  Vitals:    03/22/17 1552   BP: 124/72   Patient Site: Left Arm   Patient Position: Sitting   Cuff Size: Adult Large   Pulse: 80   Resp: 20   Temp: 97.7  F (36.5  C)   TempSrc: Oral   Weight: (!) 294 lb (133.4 kg)   Height: 5' 6\" (1.676 m)     Left sided tenderness on back on palpation.  Her chest is clear to auscultation bilaterally.  Cardiovascular exam normal.  Abdomen is soft and nontender.  Diagnostics  Jessica review chest x-ray.  Continues to show a density on the lateral view in the upper third of the lung field, narrow,.   Results for orders placed or performed in visit on 03/22/17   Urinalysis   Result Value Ref Range    Color, UA Yellow Colorless, Yellow, Straw, Light Yellow    Clarity, UA Clear Clear    Glucose, UA Negative Negative    Bilirubin, UA Negative Negative    Ketones, UA Negative Negative    Specific Gravity, UA <=1.005 1.005 - 1.030    Blood, UA Negative Negative    pH, UA 5.5 5.0 - 8.0    Protein, UA Negative Negative mg/dL    Urobilinogen, UA 0.2 E.U./dL 0.2 E.U./dL, 1.0 E.U./dL    Nitrite, UA Negative Negative    Leukocytes, UA Negative Negative       Please note: Voice " recognition software was used in this dictation.  It may therefore contain typographical errors.

## 2021-06-09 NOTE — PROGRESS NOTES
[facility]b. [facility] and sore throat.  Neg for strep.  Ear fluid.  Doxycycline.  No better.  Came to [facility].  Dr Reynoso.  Given mucinex and pred 60 /d few days.  Started with back pain.  Then saw Dr Wills.  Blood in urine and got CT.  Ct neg.  cxr was neg.    Then got cough attack and metallic taste in mouth.  Cough a little bit of blood.   Came in four days ago and got 40 /d of pred.   On pred the cough was not better.    Presently feels okay but with a little back pain.  Was there, then gone and now is back.  Thinks related timewise to prednisone.  Currently four daily pred 10.    Feels sob with walk.  Depends on if cough.  Can sob with down and up stairs.  Sweating a lot  Has ongoing fever  Gets bad headache with cough  Smokes half ppd.  Cut down from one ppd.  18 years.  No rash  Urine no blood  Normal stools    8yo with similar sx which came and went already.    Has never been told she wheezed.    Last day at work feb 24.    Home since.    ROS: as noted above    OBJECTIVE:   Vitals:    03/16/17 1123   BP: 120/72   Pulse: 79   Resp: 22   Temp: 99.2  F (37.3  C)   SpO2: 97%    here with 8 yo daughter  Head: atraumatic   Eyes: nl eom, anicteric   Ears: nl external ears   Neck: nl nodes, supple   Lungs: clear to ausc   Minimal exp prolongation.  No wheeze  Heart: regular rhythm  Back: no tenderness  Abd: soft nontender   Joints: uninflamed   Ext: nontender calves   Mental: euthymic  Neuro: no weakness  Gait: normal    Labs   cxr   Notes reviewed     ASSESSMENT/PLAN:  Hematuria ? Etiology.  Nl CT./ labs  Treat as common bronchitis ? Mycoplasma  triston dep/ advised continued efforts to stop  ? Hematuria and hemoptysis  ? Nephritic syndromes ? Wegener's/ labs.  If persistence, ? Nephrology referral.  Pt educated.  1. Blood in urine  Basic Metabolic Panel    Urinalysis-UC if Indicated    Culture, Urine    CANCELED: Urinalysis   2. Acute bronchitis, unspecified organism  azithromycin (ZITHROMAX)  250 MG tablet   3. Nicotine dependence

## 2021-06-09 NOTE — PROGRESS NOTES
Assessment/ Plan  1. Back pain  2. Hematuria  Need to rule out left-sided nephrolithiasis    CT scan is most effective way    If scan is negative, most likely related to musculoskeletal pain    - Creatinine  - Pregnancy (Beta-hCG, Qual), Urine  - CT Abdomen Pelvis Without Oral With IV Contrast; Future    3. Cough  Lateral x-ray shows a band of density which either represents minor fissure (doubt) or atelectasis.  Await radiology over read  - XR Chest PA and Lateral; Future    Body mass index is 47.45 kg/(m^2).  Patient given note excusing her from work this entire week, March 6 through March 10  Subjective  CC:  Chief Complaint   Patient presents with     Back Pain     lower left, keeping paitent up at night.     HPI:  Back pain  Narrative 36-year-old obese female recovering from a URI complains of mid back pain that began about 4 days ago.  ---------------------  Duration/timing- 4 days- came on suddenly;    Location/ radiation- L upper lumbar  Quality/Severity-8/10 when at its worst last night.  Sharp.  Coughing seemed to bring it on last night- but sometimes without cough.    Context/modifying factors-coughing seemed to make better  Red flags- progressive weakness, weight loss/ fever, night-time awakening?-No  Back pain history  previous evaluation, treatment, imaging? -no- this is somewhat similar  Comments-recent     Still coughing- dry.  Thick mucus from nose  No sob  Patient is a smoker.  Contemplative/pre-contemplative about quitting          PFSH:  Current medications reviewed as follows:  Current Outpatient Prescriptions on File Prior to Visit   Medication Sig     doxycycline (VIBRA-TABS) 100 MG tablet      guaiFENesin ER (MUCINEX) 600 mg 12 hr tablet Take 1 tablet (600 mg total) by mouth 2 (two) times a day for 10 days.     levonorgestrel (MIRENA) 20 mcg/24 hr (5 years) IUD 1 each by Intrauterine route once.     predniSONE (DELTASONE) 20 MG tablet Take 3 tablets at once daily for five days     No current  "facility-administered medications on file prior to visit.      There is no problem list on file for this patient.    History   Smoking Status     Current Every Day Smoker     Packs/day: 1.00     Years: 15.00   Smokeless Tobacco     Not on file     Comment: half to almost a pack a day     Social History     Social History Narrative     Patient Care Team:  Peyman Reynoso MD as PCP - General (Family Medicine)  ROS  Full 10 system review including constitutional, respiratory, cardiac, gi, urinary, rheumatologic, neurologic, reproductive, dermatologic psychiatric is  performed (via questionnaire) and is negative except fatigue, back pain      Objective  Physical Exam  Vitals:    03/08/17 1004   BP: 124/72   Patient Site: Right Arm   Patient Position: Sitting   Cuff Size: Adult Large   Pulse: 96   Resp: 20   Temp: 97.2  F (36.2  C)   TempSrc: Oral   Weight: (!) 294 lb (133.4 kg)   Height: 5' 6\" (1.676 m)     Doesn't come obese female, no distress.  Lungs are clear to auscultation.  Normal duration of inspiration and expiration, normal breath sounds.  No wheezing, rales or rhonchi.  No accessory muscle use.  Left lower thoracic/upper lumbar back is tender over paraspinous muscles.  Moderately so.  No paraspinous tenderness.  Good range of motion.  No significant flank tenderness.    Diagnostics  Personally reviewed chest x-ray which shows atelectasis as discussed above  Results for orders placed or performed in visit on 03/08/17   Urinalysis-UC if Indicated   Result Value Ref Range    Color, UA Yellow Colorless, Yellow, Straw, Light Yellow    Clarity, UA Clear Clear    Glucose, UA Negative Negative    Bilirubin, UA Negative Negative    Ketones, UA Negative Negative    Specific Gravity, UA <=1.005 1.005 - 1.030    Blood, UA Large (!) Negative    pH, UA 6.0 5.0 - 8.0    Protein, UA Negative Negative mg/dL    Urobilinogen, UA 0.2 E.U./dL 0.2 E.U./dL, 1.0 E.U./dL    Nitrite, UA Negative Negative    Leukocytes, UA Negative " Negative    Bacteria, UA Few (!) None Seen hpf    RBC, UA 3-5 (!) None Seen, 0-2 hpf    WBC, UA 0-5 None Seen, 0-5 hpf    Squam Epithel, UA 0-5 None Seen, 0-5 lpf   Creatinine   Result Value Ref Range    Creatinine 0.79 0.60 - 1.10 mg/dL    GFR MDRD Af Amer >60 >60 mL/min/1.73m2    GFR MDRD Non Af Amer >60 >60 mL/min/1.73m2   Pregnancy (Beta-hCG, Qual), Urine   Result Value Ref Range    Pregnancy Test, Urine Negative Negative    Specific Gravity, UA <=1.005 1.001 - 1.030       Please note: Voice recognition software was used in this dictation.  It may therefore contain typographical errors.

## 2021-06-09 NOTE — PROGRESS NOTES
Assessment:   1. Bronchitis  2. Smoker    Plan:   Afebrile. No respiratory distress. Deferred imaging for today.   Continue doxycycline as prescribed.   Start prednisone. Start mucinex. Discussed appropriate use. Cautioned over possible adverse affects.   Counseled on smoking cessation. Not ready to quit.   Note provided for absence at work.   Worsening signs and symptoms discussed.  Rest, fluids, acetaminophen, and humidification.  Follow up as needed for persistent, worsening cough, or appearance of new symptoms.  Discussed reasons to be seen urgently.     Subjective:       Sejal Ball is a 36 y.o. female who presents for evaluation of nasal congestion, nonproductive cough, sneezing and sore throat. Symptoms began 2 weeks ago. Symptoms have been unchanged since that time. Past history is significant for smoking, fifteen pack years. Here throat was very sore last week. Was seen at a [facility] clinic. Step testing was negative. Was placed on doxycyline though five days ago. She has ongoing dry cough, nothing seems to be coming up. She denies COPD or asthma, but feels like she might be wheezing at night time only. No chest pain. No shortness of breath. Is tolerating diet. Feels feverish and chilly but no documented temperature. Her daughter is ill with something. She is not able to quit smoking. Has been out of work this whole week. Needs a note for her absence. 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     The following portions of the patient's history were reviewed and updated as appropriate: allergies, current medications, past family history, past medical history, past social history, past surgical history and problem list.  Past Medical History:   Diagnosis Date     Anovulation      Obesity      Tobacco abuse      There is no problem list on file for this patient.    Past Surgical History:   Procedure Laterality Date     CHOLECYSTECTOMY       Family History   Problem Relation Age of Onset     Heart disease Mother       Prostate cancer Father      Hypertension Father      Social History     Social History     Marital status: Single     Spouse name: N/A     Number of children: N/A     Years of education: N/A     Occupational History     Not on file.     Social History Main Topics     Smoking status: Current Every Day Smoker     Packs/day: 1.00     Years: 15.00     Smokeless tobacco: Not on file      Comment: half to almost a pack a day     Alcohol use Not on file     Drug use: Not on file     Sexual activity: Not on file      Comment: Mirena     Other Topics Concern     Not on file     Social History Narrative     No current outpatient prescriptions on file prior to visit.     No current facility-administered medications on file prior to visit.        Review of Systems  A 12 point comprehensive review of systems was negative except as noted.      Objective:        General Appearance:    Alert, cooperative, no distress, appears stated age   Head:    Normocephalic, without obvious abnormality, atraumatic   Eyes:    PERRL, conjunctiva/corneas clear, EOM's intact, fundi     benign, both eyes   Ears:    Normal TM's and external ear canals, both ears   Nose:   Nares normal, septum midline, mucosa normal, no drainage    or sinus tenderness   Throat:   Lips, mucosa, and tongue normal; posterior pharynx erythematous, no exudates, uvula midline   Neck:   Supple, symmetrical, trachea midline, no adenopathy;     thyroid:  no enlargement/tenderness/nodules; no carotid    bruit or JVD   Lungs:     Clear to auscultation bilaterally, respirations unlabored   Chest Wall:    No tenderness or deformity    Heart:    Regular rate and rhythm, S1 and S2 normal, no murmur, rub   or gallop   Extremities:   Extremities normal, atraumatic, no cyanosis or edema   Pulses:   2+ and symmetric all extremities   Skin:   Skin color, texture, turgor normal, no rashes or lesions   Lymph nodes:   Cervical, supraclavicular nodes normal   Neurologic:   CNII-XII intact,  normal strength, sensation and reflexes     throughout

## 2021-06-11 NOTE — TELEPHONE ENCOUNTER
Refill Approved    Rx renewed per Medication Renewal Policy. Medication was last renewed on 4/3/20, last OV 10/8/19.    Erika Coello, [facility] Triage/Med Refill 9/13/2020     Requested Prescriptions   Pending Prescriptions Disp Refills     albuterol (PROAIR HFA;PROVENTIL HFA;VENTOLIN HFA) 90 mcg/actuation inhaler [Pharmacy Med Name: ALBUTEROL HFA (VENTOLIN) INH] 54 Inhaler 1     Sig: INHALE 2 PUFFS BY MOUTH EVERY 6 HOURS AS NEEDED FOR WHEEZE       Albuterol/Levalbuterol Refill Protocol Passed - 9/11/2020 11:00 PM        Passed - PCP or prescribing provider visit in last year     Last office visit with prescriber/PCP: 4/12/2019 Annie Vazquez MD OR same dept: Visit date not found OR same specialty: 4/12/2019 Annie Vazquez MD Last physical: 10/8/2019       Next appt within 3 mo: Visit date not found  Next physical within 3 mo: Visit date not found  Prescriber OR PCP: Annie Vazquez MD  Last diagnosis associated with med order: 1. Acute non-recurrent maxillary sinusitis  - albuterol (PROAIR HFA;PROVENTIL HFA;VENTOLIN HFA) 90 mcg/actuation inhaler [Pharmacy Med Name: ALBUTEROL HFA (VENTOLIN) INH]; INHALE 2 PUFFS BY MOUTH EVERY 6 HOURS AS NEEDED FOR WHEEZE  Dispense: 54 Inhaler; Refill: 1    If protocol passes may refill for 6 months if within 3 months of last provider visit (or a total of 9 months). If patient requesting >1 inhaler per month refill x 6 months and have patient make appointment with provider.

## 2021-06-12 NOTE — TELEPHONE ENCOUNTER
Called and spoke with Sejal Ball , Message was given, Sejal Ball  understood, no further questions.

## 2021-06-12 NOTE — PROGRESS NOTES
"Sejal Ball is a 40 y.o. female who is being evaluated via a billable video visit.      The patient has been notified of following:     \"This video visit will be conducted via a call between you and your physician/provider. We have found that certain health care needs can be provided without the need for an in-person physical exam.  This service lets us provide the care you need with a video conversation.  If a prescription is necessary we can send it directly to your pharmacy.  If lab work is needed we can place an order for that and you can then stop by our lab to have the test done at a later time.    Video visits are billed at different rates depending on your insurance coverage. Please reach out to your insurance provider with any questions.    If during the course of the call the physician/provider feels a video visit is not appropriate, you will not be charged for this service.\"    Patient has given verbal consent to a Video visit? Yes  How would you like to obtain your AVS? AVS Preference: MyChart.  If dropped by the video visit, the video invitation should be sent to: Text to cell phone: [phone]  Will anyone else be joining your video visit? No        Video Start Time:    Additional provider notes:   ASSESSMENT/PLAN:  1. Acute non-recurrent maxillary sinusitis  doxycycline (MONODOX) 100 MG capsule       This is a 41 yo female with acute maxillary sinusitis - Will treat with Doxycycline.  Discussed COVID-19 testing if patient has further symptoms.    Return in about 2 weeks (around 11/23/2020) for if not getting better.      Medications Discontinued During This Encounter   Medication Reason     azithromycin (ZITHROMAX) 250 MG tablet Therapy completed     benzonatate (TESSALON) 200 MG capsule Therapy completed     IBUPROFEN ORAL Therapy completed     naproxen (NAPROSYN) 500 MG tablet Therapy completed     There are no Patient Instructions on file for this visit.    Chief Complaint:  Chief Complaint "   Patient presents with     Sinus Problem     drainage     Cough       HPI:   Sejal Ball is a 40 y.o. female c/o   had sinus infection   Now patient has icky cough and icky phlegm  Daughter is the same way  When she coughs, feels it in her back -   Breaking up when coughing  Can feel the drainage  Was blowing good clear stuff, now icky green   No fever, no chills  Taste impaired by   Couldn't smell for a few days -   Has been homebound - daughter is homebound with school   works outside home    Does leave home for grocery shopping  Daughter is overdue for well child check - has same symptoms  Has allergies on top of hers          PMH:   Patient Active Problem List    Diagnosis Date Noted     Contraception management 10/08/2019     Mirena IUD (expires 10/2018) 02/20/2018     Female infertility associated with anovulation 02/19/2018     Herpes simplex type 2 infection 02/19/2018     Tobacco use disorder 02/19/2018     Morbid obesity (H) 02/19/2018     Trouble in sleeping 02/19/2018     Itchy skin - right breast 02/19/2018     GA (granuloma annulare) 08/11/2011     Past Medical History:   Diagnosis Date     Anovulation      History of anesthesia complications     Some gasping after gallbaldder surgery     Obesity      Tobacco abuse      Past Surgical History:   Procedure Laterality Date     CHOLECYSTECTOMY       CT LAP,TUBAL CAUTERY Bilateral 10/10/2019    Procedure: LAPAROSCOPIC BILATERAL SALPINGECTOMY, LEFT OVARIAN CYSTECTOMY;  Surgeon: Angela Means MD;  Location: [facility];  Service: Gynecology     Social History     Socioeconomic History     Marital status: Single     Spouse name: Not on file     Number of children: Not on file     Years of education: Not on file     Highest education level: Not on file   Occupational History     Not on file   Social Needs     Financial resource strain: Not on file     Food insecurity     Worry: Not on file     Inability: Not on file      Transportation needs     Medical: Not on file     Non-medical: Not on file   Tobacco Use     Smoking status: Current Every Day Smoker     Packs/day: 1.00     Years: 15.00     Pack years: 15.00     Types: Cigarettes     Smokeless tobacco: Never Used     Tobacco comment: half to almost a pack a day   Substance and Sexual Activity     Alcohol use: Never     Frequency: Never     Drug use: Never     Sexual activity: Not on file     Comment: Mirena   Lifestyle     Physical activity     Days per week: Not on file     Minutes per session: Not on file     Stress: Not on file   Relationships     Social connections     Talks on phone: Not on file     Gets together: Not on file     Attends Islam service: Not on file     Active member of club or organization: Not on file     Attends meetings of clubs or organizations: Not on file     Relationship status: Not on file     Intimate partner violence     Fear of current or ex partner: Not on file     Emotionally abused: Not on file     Physically abused: Not on file     Forced sexual activity: Not on file   Other Topics Concern     Not on file   Social History Narrative     Not on file     Family History   Problem Relation Age of Onset     Heart disease Mother      Prostate cancer Father      Hypertension Father        Meds:    Current Outpatient Medications:      albuterol (PROAIR HFA;PROVENTIL HFA;VENTOLIN HFA) 90 mcg/actuation inhaler, Inhale 2 puffs every 4 (four) hours as needed for wheezing or shortness of breath., Disp: 1 each, Rfl: 0     albuterol (PROAIR HFA;PROVENTIL HFA;VENTOLIN HFA) 90 mcg/actuation inhaler, INHALE 2 PUFFS BY MOUTH EVERY 6 HOURS AS NEEDED FOR WHEEZE, Disp: 54 Inhaler, Rfl: 1     levonorgestrel (MIRENA) 20 mcg/24 hr (5 years) IUD, 1 each by Intrauterine route once., Disp: , Rfl:      doxycycline (MONODOX) 100 MG capsule, Take 1 capsule (100 mg total) by mouth 2 (two) times a day for 10 days., Disp: 20 capsule, Rfl: 0     ibuprofen (ADVIL,MOTRIN) 600 MG  tablet, Take 1 tablet (600 mg total) by mouth every 6 (six) hours as needed for pain., Disp: 45 tablet, Rfl: 0    Allergies:  Allergies   Allergen Reactions     [place] Oil Other (See Comments)     Tongue tingling     Black [place] Hives     Other nuts do not bother patient     Penicillins Hives       ROS:  Pertinent positives as noted in HPI; otherwise 12 point ROS negative.      Physical Exam:  EXAM:  There were no vitals taken for this visit.   Gen:  NAD, appears well, well-hydrated  Heent:  Demonstrates tenderness overlying maxillary sinuses  Extr:  Neg.  Neuro:  No asymmetry  Skin:  No obvious lesions        Results    Video-Visit Details    Type of service:  Video Visit    Video End Time (time video stopped): 6 minutes  Originating Location (pt. Location): Home    Distant Location (provider location):  [facility]     Platform used for Video Visit: Yoly Vazquez MD

## 2021-06-12 NOTE — TELEPHONE ENCOUNTER
Hard to tell what is going on .  She probably needs COVID testing.  Encourage a visit - even virtual to help sort this out.

## 2021-06-12 NOTE — TELEPHONE ENCOUNTER
Cough with runny nose, began Saturday. Sinus drainage and pressure. Her cough is dry, it makes her gag to get it up. She is coughing so hard that she leaks urine a little bit. No fever noted.   Hx of sinus infections that sometimes turns into bronchitis.  Triaged to a disposition of Call provider when office is open, which she intends to do in am. Also discussed option of [email or web address].    Gina Garcia RN Triage Nurse Advisor 8:55 PM 11/5/2020    Reason for Disposition    [1] COVID-19 infection suspected by caller or triager AND [2] mild symptoms (cough, fever, or others) AND [3] no complications or SOB    Additional Information    Negative: SEVERE difficulty breathing (e.g., struggling for each breath, speaks in single words)    Negative: Difficult to awaken or acting confused (e.g., disoriented, slurred speech)    Negative: Bluish (or gray) lips or face now    Negative: Shock suspected (e.g., cold/pale/clammy skin, too weak to stand, low BP, rapid pulse)    Negative: Sounds like a life-threatening emergency to the triager    Negative: [1] COVID-19 exposure AND [2] no symptoms    Negative: COVID-19 and Breastfeeding, questions about    Negative: [1] Adult with possible COVID-19 symptoms AND [2] triager concerned about severity of symptoms or other causes    Negative: SEVERE or constant chest pain or pressure (Exception: mild central chest pain, present only when coughing)    Negative: MODERATE difficulty breathing (e.g., speaks in phrases, SOB even at rest, pulse 100-120)    Negative: Patient sounds very sick or weak to the triager    Negative: MILD difficulty breathing (e.g., minimal/no SOB at rest, SOB with walking, pulse <100)    Negative: Chest pain or pressure    Negative: Fever > 103 F (39.4 C)    Negative: [1] Fever > 101 F (38.3 C) AND [2] age > 60    Negative: [1] Fever > 100.0 F (37.8 C) AND [2] bedridden (e.g., nursing home patient, CVA, chronic illness, recovering from surgery)    Negative: HIGH RISK  patient (e.g., age > 64 years, diabetes, heart or lung disease, weak immune system) (Exception: Has already been evaluated by healthcare provider and has no new or worsening symptoms)    Negative: Fever present > 3 days (72 hours)    Negative: [1] Fever returns after gone for over 24 hours AND [2] symptoms worse or not improved    Negative: [1] Continuous (nonstop) coughing interferes with work or school AND [2] no improvement using cough treatment per protocol    Protocols used: CORONAVIRUS (COVID-19) DIAGNOSED OR [id number] 8.4.20  COVID 19 Nurse Triage Plan/Patient Instructions    Please be aware that novel coronavirus (COVID-19) may be circulating in the community. If you develop symptoms such as fever, cough, or SOB or if you have concerns about the presence of another infection including coronavirus (COVID-19), please contact your health care provider or visit [email or web address].     Disposition/Instructions    Virtual Visit with provider recommended. Reference Visit Selection Guide.    Thank you for taking steps to prevent the spread of this virus.  o Limit your contact with others.  o Wear a simple mask to cover your cough.  o Wash your hands well and often.    Resources    [facility]: About COVID-19: [email or web address]    CDC: What to Do If You're Sick: www.cdc.gov/coronavirus/2019-ncov/about/steps-when-sick.html    CDC: Ending Home Isolation: www.cdc.gov/coronavirus/2019-ncov/hcp/disposition-in-home-patients.html     CDC: Caring for Someone: www.cdc.gov/coronavirus/2019-ncov/if-you-are-sick/care-for-someone.html     [facility]: Interim Guidance for Hospital Discharge to Home: www.health.[facility].[place]/diseases/coronavirus/hcp/hospdischarge.pdf    [facility] clinical trials (COVID-19 research studies): clinicalaffairs.[facility]/umn-clinical-trials     Below are the COVID-19 hotlines at the [place] [facility] ([facility]). Interpreters are available.   o For health questions: Call  566.563.1355 or 1-934.528.6931 (7 a.m. to 7 p.m.)  For questions about schools and childcare: Call 303-316-2523 or 1-590.915.8958 (7 a.m. to 7 p.m.)

## 2021-06-13 NOTE — TELEPHONE ENCOUNTER
Sejal calls and says that her left knee is swollen and has sharp pains in the knee. Symptom began 3 weeks ago. Denies any recent knee injury. Afebrile. Pt. Says that she will go to an  clinic to see a Dr. Charlette Dumont RN FNA  COVID 19 Nurse Triage Plan/Patient Instructions    Please be aware that novel coronavirus (COVID-19) may be circulating in the community. If you develop symptoms such as fever, cough, or SOB or if you have concerns about the presence of another infection including coronavirus (COVID-19), please contact your health care provider or visit [email or web address].     Disposition/Instructions    In-Person Visit with provider recommended. Reference Visit Selection Guide.    Thank you for taking steps to prevent the spread of this virus.  o Limit your contact with others.  o Wear a simple mask to cover your cough.  o Wash your hands well and often.    Resources    [facility]: About COVID-19: [email or web address]    CDC: What to Do If You're Sick: www.cdc.gov/coronavirus/2019-ncov/about/steps-when-sick.html    CDC: Ending Home Isolation: www.cdc.gov/coronavirus/2019-ncov/hcp/disposition-in-home-patients.html     CDC: Caring for Someone: www.cdc.gov/coronavirus/2019-ncov/if-you-are-sick/care-for-someone.html     [facility]: Interim Guidance for Hospital Discharge to Home: www.health.[facility].[place]/diseases/coronavirus/hcp/hospdischarge.pdf    [facility] clinical trials (COVID-19 research studies): clinicalaffairs.[facility]-clinical-trials     Below are the COVID-19 hotlines at the [facility] ([facility]). Interpreters are available.   o For health questions: Call 276-529-3176 or 1-394.456.6606 (7 a.m. to 7 p.m.)  o For questions about schools and childcare: Call 702-132-5386 or 1-892.398.3542 (7 a.m. to 7 p.m.)       Reason for Disposition    [1] Very swollen joint AND [2] no fever    Additional Information    Negative: Sounds like a life-threatening emergency to the  triager    Negative: Followed a knee injury    Negative: Looks like a broken bone or dislocated joint (e.g., crooked or deformed)    Negative: Sounds like a life-threatening emergency to the triager    Negative: Followed a leg injury    Negative: Leg swelling is main symptom    Negative: Back pain radiating (shooting) into leg(s)    Knee pain is main symptom    Negative: Leg pain is main symptom    Negative: Knee swelling is main symptom    Negative: [1] Swollen joint AND [2] fever    Negative: [1] Red area or streak AND [2] fever    Negative: Patient sounds very sick or weak to the triager    Negative: [1] SEVERE pain (e.g., excruciating, unable to walk) AND [2] not improved after 2 hours of pain medicine    Negative: [1] Can't move swollen joint at all AND [2] no fever    Negative: [1] Thigh or calf pain AND [2] only 1 side AND [3] present > 1 hour    Negative: [1] Thigh, calf, or ankle swelling AND [2] only 1 side    Negative: [1] Looks infected (spreading redness, pus) AND [2] large red area (> 2 in. or 5 cm)    Protocols used: KNEE PAIN-A-AH, LEG PAIN-A-AH

## 2021-06-14 NOTE — PROGRESS NOTES
"ASSESSMENT/PLAN:  1. Internal derangement of knee, left  Ambulatory referral to Orthopedics       This is a 41 yo female who has pain in her left knee - pain began after a reported \"charley horse\" that occurred while sleeping - exam would suggest internal derangement in that knee.  We discussed options - she has been symptomatic for at least 3 weeks.  Could continue to treat with NSAIDS (she thought they weren't helpful), compression (she tried this and it hurt worse).  Or - she can see Ortho.  We will refer (but if symptoms worsen, could go to [facility]).  She is agreeable.    Return in about 1 month (around 1/22/2021) for if not getting better.      There are no discontinued medications.  There are no Patient Instructions on file for this visit.    Chief Complaint:  Chief Complaint   Patient presents with     knee pain     left knee pain, swelling, and sometimes twitches       HPI:   Sejal Ball is a 40 y.o. female c/o  Got a new dog - [employer] [place] [employer]     Left knee -   3 weeks ago - had a charley horse in her sleep  Thinks she twisted something -   Ever since then, pain   Tried wrapping - hurt more  Heat, ice, compression - hurts  From obve     PMH:   Patient Active Problem List    Diagnosis Date Noted     Contraception management 10/08/2019     Mirena IUD (expires 10/2018) 02/20/2018     Female infertility associated with anovulation 02/19/2018     Herpes simplex type 2 infection 02/19/2018     Tobacco use disorder 02/19/2018     Morbid obesity (H) 02/19/2018     Trouble in sleeping 02/19/2018     Itchy skin - right breast 02/19/2018     GA (granuloma annulare) 08/11/2011     Past Medical History:   Diagnosis Date     Anovulation      History of anesthesia complications     Some gasping after gallbaldder surgery     Obesity      Tobacco abuse      Past Surgical History:   Procedure Laterality Date     CHOLECYSTECTOMY       ME LAP,TUBAL CAUTERY Bilateral 10/10/2019    Procedure: LAPAROSCOPIC BILATERAL " SALPINGECTOMY, LEFT OVARIAN CYSTECTOMY;  Surgeon: Angela Means MD;  Location: [facility];  Service: Gynecology     Social History     Socioeconomic History     Marital status: Single     Spouse name: Not on file     Number of children: Not on file     Years of education: Not on file     Highest education level: Not on file   Occupational History     Not on file   Social Needs     Financial resource strain: Not on file     Food insecurity     Worry: Not on file     Inability: Not on file     Transportation needs     Medical: Not on file     Non-medical: Not on file   Tobacco Use     Smoking status: Current Every Day Smoker     Packs/day: 1.00     Years: 15.00     Pack years: 15.00     Types: Cigarettes     Smokeless tobacco: Never Used     Tobacco comment: half to almost a pack a day   Substance and Sexual Activity     Alcohol use: Never     Frequency: Never     Drug use: Never     Sexual activity: Not on file     Comment: Mirena   Lifestyle     Physical activity     Days per week: Not on file     Minutes per session: Not on file     Stress: Not on file   Relationships     Social connections     Talks on phone: Not on file     Gets together: Not on file     Attends [employer] service: Not on file     Active member of club or organization: Not on file     Attends meetings of clubs or organizations: Not on file     Relationship status: Not on file     Intimate partner violence     Fear of current or ex partner: Not on file     Emotionally abused: Not on file     Physically abused: Not on file     Forced sexual activity: Not on file   Other Topics Concern     Not on file   Social History Narrative     Not on file     Family History   Problem Relation Age of Onset     Heart disease Mother      Prostate cancer Father      Hypertension Father        Meds:    Current Outpatient Medications:      albuterol (PROAIR HFA;PROVENTIL HFA;VENTOLIN HFA) 90 mcg/actuation inhaler, Inhale 2 puffs every 4 (four) hours as  "needed for wheezing or shortness of breath., Disp: 1 each, Rfl: 0     albuterol (PROAIR HFA;PROVENTIL HFA;VENTOLIN HFA) 90 mcg/actuation inhaler, INHALE 2 PUFFS BY MOUTH EVERY 6 HOURS AS NEEDED FOR WHEEZE, Disp: 54 Inhaler, Rfl: 1     ibuprofen (ADVIL,MOTRIN) 600 MG tablet, Take 1 tablet (600 mg total) by mouth every 6 (six) hours as needed for pain., Disp: 45 tablet, Rfl: 0     levonorgestrel (MIRENA) 20 mcg/24 hr (5 years) IUD, 1 each by Intrauterine route once., Disp: , Rfl:     Allergies:  Allergies   Allergen Reactions     [place] Oil Other (See Comments)     Tongue tingling     Black [place] Hives     Other nuts do not bother patient     Penicillins Hives       ROS:  Pertinent positives as noted in HPI; otherwise 12 point ROS negative.      Physical Exam:  EXAM:  /77 (Patient Site: Right Arm, Patient Position: Sitting, Cuff Size: Adult Regular)   Pulse 69   Temp 98  F (36.7  C) (Temporal)   Resp 18   Ht 5' 5.75\" (1.67 m)   Wt (!) 290 lb (131.5 kg)   LMP 12/01/2020 (Within Days)   BMI 47.17 kg/m     Gen:  NAD, appears well, well-hydrated  Extr: swelling surrounding right knee - tender along patellar tendon insertion as well as lateral joint line; there is some suggestion of Bakers cyst posteriorly  Neuro:  No asymmetry  Skin:  Warm/dry        "

## 2021-06-15 ENCOUNTER — RECORDS - HEALTHEAST (OUTPATIENT)
Dept: RADIOLOGY | Facility: CLINIC | Age: 41
End: 2021-06-15

## 2021-06-15 ENCOUNTER — OFFICE VISIT - HEALTHEAST (OUTPATIENT)
Dept: FAMILY MEDICINE | Facility: CLINIC | Age: 41
End: 2021-06-15

## 2021-06-15 ENCOUNTER — RECORDS - HEALTHEAST (OUTPATIENT)
Dept: MAMMOGRAPHY | Facility: CLINIC | Age: 41
End: 2021-06-15

## 2021-06-15 DIAGNOSIS — Z11.59 ENCOUNTER FOR HCV SCREENING TEST FOR LOW RISK PATIENT: ICD-10-CM

## 2021-06-15 DIAGNOSIS — Z12.31 VISIT FOR SCREENING MAMMOGRAM: ICD-10-CM

## 2021-06-15 DIAGNOSIS — Z11.4 SCREENING FOR HUMAN IMMUNODEFICIENCY VIRUS WITHOUT PRESENCE OF RISK FACTORS: ICD-10-CM

## 2021-06-15 DIAGNOSIS — Z00.00 ROUTINE GENERAL MEDICAL EXAMINATION AT A HEALTH CARE FACILITY: ICD-10-CM

## 2021-06-15 DIAGNOSIS — F33.0 MILD EPISODE OF RECURRENT MAJOR DEPRESSIVE DISORDER (H): ICD-10-CM

## 2021-06-15 DIAGNOSIS — Z12.31 ENCOUNTER FOR SCREENING MAMMOGRAM FOR MALIGNANT NEOPLASM OF BREAST: ICD-10-CM

## 2021-06-15 LAB
ALBUMIN SERPL-MCNC: 4 G/DL (ref 3.5–5)
ALP SERPL-CCNC: 103 U/L (ref 45–120)
ALT SERPL W P-5'-P-CCNC: 11 U/L (ref 0–45)
ANION GAP SERPL CALCULATED.3IONS-SCNC: 10 MMOL/L (ref 5–18)
AST SERPL W P-5'-P-CCNC: 13 U/L (ref 0–40)
BILIRUB SERPL-MCNC: 0.4 MG/DL (ref 0–1)
BUN SERPL-MCNC: 8 MG/DL (ref 8–22)
CALCIUM SERPL-MCNC: 8.5 MG/DL (ref 8.5–10.5)
CHLORIDE BLD-SCNC: 105 MMOL/L (ref 98–107)
CHOLEST SERPL-MCNC: 142 MG/DL
CO2 SERPL-SCNC: 22 MMOL/L (ref 22–31)
CREAT SERPL-MCNC: 0.71 MG/DL (ref 0.6–1.1)
ERYTHROCYTE [DISTWIDTH] IN BLOOD BY AUTOMATED COUNT: 14.2 % (ref 11–14.5)
FASTING STATUS PATIENT QL REPORTED: YES
GFR SERPL CREATININE-BSD FRML MDRD: >60 ML/MIN/1.73M2
GLUCOSE BLD-MCNC: 82 MG/DL (ref 70–125)
HCT VFR BLD AUTO: 42.7 % (ref 35–47)
HDLC SERPL-MCNC: 34 MG/DL
HGB BLD-MCNC: 13.4 G/DL (ref 12–16)
LDLC SERPL CALC-MCNC: 87 MG/DL
MCH RBC QN AUTO: 27 PG (ref 27–34)
MCHC RBC AUTO-ENTMCNC: 31.4 G/DL (ref 32–36)
MCV RBC AUTO: 86 FL (ref 80–100)
PLATELET # BLD AUTO: 281 THOU/UL (ref 140–440)
PMV BLD AUTO: 9.7 FL (ref 7–10)
POTASSIUM BLD-SCNC: 4.4 MMOL/L (ref 3.5–5)
PROT SERPL-MCNC: 7 G/DL (ref 6–8)
RBC # BLD AUTO: 4.96 MILL/UL (ref 3.8–5.4)
SODIUM SERPL-SCNC: 137 MMOL/L (ref 136–145)
TRIGL SERPL-MCNC: 106 MG/DL
TSH SERPL DL<=0.005 MIU/L-ACNC: 1.78 UIU/ML (ref 0.3–5)
WBC: 7.3 THOU/UL (ref 4–11)

## 2021-06-15 ASSESSMENT — ANXIETY QUESTIONNAIRES
4. TROUBLE RELAXING: MORE THAN HALF THE DAYS
7. FEELING AFRAID AS IF SOMETHING AWFUL MIGHT HAPPEN: MORE THAN HALF THE DAYS
2. NOT BEING ABLE TO STOP OR CONTROL WORRYING: MORE THAN HALF THE DAYS
GAD7 TOTAL SCORE: 14
6. BECOMING EASILY ANNOYED OR IRRITABLE: NEARLY EVERY DAY
3. WORRYING TOO MUCH ABOUT DIFFERENT THINGS: MORE THAN HALF THE DAYS
IF YOU CHECKED OFF ANY PROBLEMS ON THIS QUESTIONNAIRE, HOW DIFFICULT HAVE THESE PROBLEMS MADE IT FOR YOU TO DO YOUR WORK, TAKE CARE OF THINGS AT HOME, OR GET ALONG WITH OTHER PEOPLE: VERY DIFFICULT
1. FEELING NERVOUS, ANXIOUS, OR ON EDGE: MORE THAN HALF THE DAYS
5. BEING SO RESTLESS THAT IT IS HARD TO SIT STILL: SEVERAL DAYS

## 2021-06-15 ASSESSMENT — MIFFLIN-ST. JEOR: SCORE: 1988.11

## 2021-06-15 ASSESSMENT — PATIENT HEALTH QUESTIONNAIRE - PHQ9: SUM OF ALL RESPONSES TO PHQ QUESTIONS 1-9: 18

## 2021-06-16 PROBLEM — Z97.5 IUD (INTRAUTERINE DEVICE) IN PLACE: Status: ACTIVE | Noted: 2018-02-20

## 2021-06-16 PROBLEM — B00.9 HERPES SIMPLEX TYPE 2 INFECTION: Status: ACTIVE | Noted: 2018-02-19

## 2021-06-16 PROBLEM — E66.01 MORBID OBESITY (H): Status: ACTIVE | Noted: 2018-02-19

## 2021-06-16 PROBLEM — Z30.9 CONTRACEPTION MANAGEMENT: Status: ACTIVE | Noted: 2019-10-08

## 2021-06-16 PROBLEM — G47.9 TROUBLE IN SLEEPING: Status: ACTIVE | Noted: 2018-02-19

## 2021-06-16 PROBLEM — N97.0 FEMALE INFERTILITY ASSOCIATED WITH ANOVULATION: Status: ACTIVE | Noted: 2018-02-19

## 2021-06-16 PROBLEM — F17.200 TOBACCO USE DISORDER: Status: ACTIVE | Noted: 2018-02-19

## 2021-06-16 PROBLEM — L29.9 ITCHY SKIN: Status: ACTIVE | Noted: 2018-02-19

## 2021-06-16 LAB
25(OH)D3 SERPL-MCNC: 21.5 NG/ML (ref 30–80)
HCV AB SERPL QL IA: NEGATIVE
HIV 1+2 AB+HIV1 P24 AG SERPL QL IA: NEGATIVE

## 2021-06-16 NOTE — PROGRESS NOTES
Subjective:     Sejal Ball is a 37 y.o. female who presents for an annual exam.     Other concerns today:  1.  Vaginal irritation.  She has not had any discharge.  She has noticed a fishy odor.  She is wondering if she has bacterial vaginosis.  She notes that she does seem to get this more frequently since she had her IUD placed.    2.  Increased stress at work.  She has been promoted, taking on more responsibility.  Therefore her work hours are longer and more stressful.  When she comes home she does not really have any energy to do much of anything.  She notes she is not sleeping as well.  She has trouble falling asleep.  She does not use any sleep aids.    3.  Skin itching on breast.  This is been going on for 1 or 2 weeks.  It is primarily the area from 12:00 to 3:00 on the right breast.  No pain or other concerning findings.  She has been putting lotion on the area which has helped a little.    She is a smoker, smoking 1 pack a day.  Mirena expires in October 2018, per her report.  PHQ 9 = 4    Immunization History   Administered Date(s) Administered     DTP 1980, 1980, 1980, 1980, 11/01/1981     Influenza, inj, historic,unspecified 10/11/2010, 10/10/2011, 10/19/2012, 10/17/2013, 10/13/2014, 10/11/2015, 10/25/2016     Influenza,inj,MDCK,PF,Quad >4yrs 10/09/2017     MMR 10/24/1981, 08/19/1992     OPV,Trivalent,Historic(5629-3455 only) 1980, 1980, 11/01/1981, 08/01/1985     Tdap 07/11/2009     Gynecologic History  Patient's last menstrual period was 02/09/2018.  Contraception: IUD, Mirena  Last Pap: years ago  Last mammogram: a few years ago for specific issue, normal      Current Outpatient Prescriptions:      ibuprofen (ADVIL,MOTRIN) 800 MG tablet, Take 1 tablet (800 mg total) by mouth every 8 (eight) hours as needed for pain. Take with food., Disp: 60 tablet, Rfl: 1     levonorgestrel (MIRENA) 20 mcg/24 hr (5 years) IUD, 1 each by Intrauterine route once., Disp: , Rfl:       naproxen (NAPROSYN) 500 MG tablet, Take 500 mg by mouth., Disp: , Rfl:      metroNIDAZOLE (FLAGYL) 500 MG tablet, Take 1 tablet (500 mg total) by mouth 2 (two) times a day for 7 days., Disp: 14 tablet, Rfl: 0  Past Medical History:   Diagnosis Date     Anovulation      Obesity      Tobacco abuse      Past Surgical History:   Procedure Laterality Date     CHOLECYSTECTOMY       [place] oil; Black walnut; and Penicillins  Family History   Problem Relation Age of Onset     Heart disease Mother      Prostate cancer Father      Hypertension Father      Social History     Social History     Marital status: Single     Spouse name: N/A     Number of children: N/A     Years of education: N/A     Occupational History     Not on file.     Social History Main Topics     Smoking status: Current Every Day Smoker     Packs/day: 1.00     Years: 15.00     Types: Cigarettes     Smokeless tobacco: Never Used      Comment: half to almost a pack a day     Alcohol use Not on file     Drug use: Not on file     Sexual activity: Not on file      Comment: Mirena     Other Topics Concern     Not on file     Social History Narrative     Review of Systems  Complete Review of Systems is discussed with patient and is negative except as noted in HPI.    Objective:     Vitals:    02/19/18 1035   BP: 115/70   Pulse: 86   Resp: 20   Temp: 97.7  F (36.5  C)     Body mass index is 46.79 kg/(m^2).    Physical Exam:  General: Patient is alert and Oriented x 3, in no apparent distress.  HEENT, Thyroid, Lymphatic, Cardiac, Pulmonary, GI, Musculoskeletal, and Neuro exams were completed today and grossly normal.  Breast Exam: No lumps, lymphadenopathy, or nipple discharge noted bilaterally.  Very mild area of slightly erythematous skin present on the right breast from about 12:00 to 3:00.  No flaking or dry skin, no more concerning signs.  Continue to monitor.  Genitourinary Exam: External genitalia is normal in appearance, vaginal walls are healthy and  without lesions, no significant discharge noted in the vaginal vault, cervix is well visualized and normal in appearance.  Pap was taken without difficulty.  IUD strings visualized today.    Results for orders placed or performed in visit on 02/19/18   Wet Prep, Vaginal   Result Value Ref Range    Yeast Result No yeast seen No yeast seen    Trichomonas No Trichomonas seen No Trichomonas seen    Clue Cells, Wet Prep No Clue cells seen No Clue cells seen   Lipid Cascade   Result Value Ref Range    Cholesterol 116 <=199 mg/dL    Triglycerides 69 <=149 mg/dL    HDL Cholesterol 40 (L) >=50 mg/dL    LDL Calculated 62 <=129 mg/dL    Patient Fasting > 8hrs? Yes    Glycosylated Hemoglobin A1c   Result Value Ref Range    Hemoglobin A1c 5.4 3.5 - 6.0 %   Thyroid Stimulating Hormone (TSH)   Result Value Ref Range    TSH 2.25 0.30 - 5.00 uIU/mL   Other labs pending.    Assessment and Plan:     1. Annual physical exam  Health maintenance was discussed with patient as appropriate for age and risk factors.  Patient declined flu shot.  Has Mirena IUD for contraception, expires 10/2018.  - Lipid Cascade  - Glycosylated Hemoglobin A1c  - Gynecologic Cytology (PAP Smear)  - Thyroid Stimulating Hormone (TSH)    2. Vaginal irritation  Wet prep negative, other labs pending.  For now, treat for bacterial vaginosis based on symptoms, oral metronidazole pills sent to pharmacy.  - Wet Prep, Vaginal  - Chlamydia trachomatis & Neisseria gonorrhoeae, Amplified Detection    3. Morbid obesity  Discussed importance of weight management and healthy eating habits.  Patient is under increased stress right now, having to work more hours, so does not think she would have much time for either of these things right now.  - Thyroid Stimulating Hormone (TSH)    4. Tobacco use disorder  She is smoking 1 pack a day, in the pre-contemplative phase for quitting.  She is aware of how dangerous smoking is.  She wants to quit for her daughter.    She and her  partner both smoke.  I reviewed it may be difficult for her to quit unless they both quit together.  I reviewed general quit smoking aids such as nicotine patches, Chantix, acupuncture.  When she is ready to quit she will contact us.  She is thinking about Chantix.    5. Work stress and fatigue  She is under more stress at work, working slightly longer hours, and therefore feeling more tired and lack of energy when she gets home.  I reviewed this is normal for her situation, and it is important to try to take some time off as needed, discussed stress relieving activities.    She is thinking that next month she will try to take some days off.  She is hoping that within the next few months that her schedule will be able to slow down a little.  Continue to monitor.  - Thyroid Stimulating Hormone (TSH)    6.  Trouble sleeping  Often has trouble falling asleep.  Part of this is likely due to stress.  Recommended trying something like Tylenol PM if desired.  She will try this and then monitor.  I discussed possible side effects with her.    7.  Itching skin on right breast.  Breast exam is generally normal, mild erythema in the affected area.  No other concerning signs or symptoms today.  Continue to monitor.  I discussed warning signs to notify the clinic.    The following high BMI interventions were performed this visit: encouragement to exercise and lifestyle education regarding diet    This dictation uses voice recognition software, which may contain typographical errors.

## 2021-06-17 ENCOUNTER — RECORDS - HEALTHEAST (OUTPATIENT)
Dept: FAMILY MEDICINE | Facility: CLINIC | Age: 41
End: 2021-06-17

## 2021-06-17 NOTE — PATIENT INSTRUCTIONS - HE
Patient Instructions by Dawood Ricks DO at 1/5/2020  2:00 PM     Author: Dawood Ricks DO Service: -- Author Type: Physician    Filed: 1/5/2020  2:38 PM Encounter Date: 1/5/2020 Status: Addendum    : Dawood Ricks DO (Physician)    Related Notes: Original Note by Dawood Ricks DO (Physician) filed at 1/5/2020  2:38 PM       I think the cough is related to drainage. See hand out for advice.       Patient Education     Sinusitis (Antibiotic Treatment)    The sinuses are air-filled spaces within the bones of the face. They connect to the inside of the nose. Sinusitis is an inflammation of the tissue that lines the sinuses. Sinusitis can occur during a cold. It can also happen due to allergies to pollens and other particles in the air. Sinusitis can cause symptoms of sinus congestion and a feeling of fullness. A sinus infection causes fever, headache, and facial pain. There is often green or yellow fluid draining from the nose or into the back of the throat (post-nasal drip). You have been given antibiotics to treat this condition.  Home care    Take the full course of antibiotics as instructed. Do not stop taking them, even when you feel better.    Drink plenty of water, hot tea, and other liquids. This may help thin nasal mucus. It also may help your sinuses drain fluids.    Heat may help soothe painful areas of your face. Use a towel soaked in hot water. Or,  the shower and direct the warm spray onto your face. Using a vaporizer along with a menthol rub at night may also help soothe symptoms.     An expectorant with guaifenesin may help thin nasal mucus and help your sinuses drain fluids.    You can use an over-the-counter decongestant, unless a similar medicine was prescribed to you. Nasal sprays work the fastest. Use one that contains phenylephrine or oxymetazoline. First blow your nose gently. Then use the spray. Do not use these medicines more often than directed on the label. If you do,  your symptoms may get worse. You may also take pills that contain pseudoephedrine. Dont use products that combine multiple medicines. This is because side effects may be increased. Read labels. You can also ask the pharmacist for help. (People with high blood pressure should not use decongestants. They can raise blood pressure.)    Over-the-counter antihistamines may help if allergies contributed to your sinusitis.      Do not use nasal rinses or irrigation during an acute sinus infection, unless your healthcare provider tells you to. Rinsing may spread the infection to other areas in your sinuses.    Use acetaminophen or ibuprofen to control pain, unless another pain medicine was prescribed to you. If you have chronic liver or kidney disease or ever had a stomach ulcer, talk with your healthcare provider before using these medicines. (Aspirin should never be taken by anyone under age 18 who is ill with a fever. It may cause severe liver damage.)    Don't smoke. This can make symptoms worse.  Follow-up care  Follow up with your healthcare provider or our staff if you are better in 1 week.  When to seek medical advice  Call your healthcare provider if any of these occur:    Facial pain or headache that gets worse    Stiff neck    Unusual drowsiness or confusion    Swelling of your forehead or eyelids    Vision problems, such as blurred or double vision    Fever of 100.4 F (38 C) or higher, or as directed by your healthcare provider    Seizure    Breathing problems    Symptoms don't go away in 10 days  Prevention  Here are steps you can take to help prevent an infection:    Keep good hand washing habits.    Dont have close contact with people who have sore throats, colds, or other upper respiratory infections.    Dont smoke, and stay away from secondhand smoke.    Stay up to date with of your vaccines.  Date Last Reviewed: 11/1/2017 2000-2017 [employer]. [address] [place] [address], [place] [address]. All  rights reserved. This information is not intended as a substitute for professional medical care. Always follow your healthcare professional's instructions.

## 2021-06-20 NOTE — PROGRESS NOTES
[facility] PRE-OPERATIVE VISIT NOTE    Sejal Ball,  1980    Upcoming surgery date: not yet scheduled  Surgeon: unknown  Surgery Facility: unknown    Procedure: Tubal ligation    SUBJECTIVE:  History of Present Illness:   Sejal Ball is a 38 y.o. female with desire for permanent contraception.  She currently has Mirena.  It is due to  in 2018.  Ever since she has had Mirena she has noticed a bad vaginal odor, increased frequency of bacterial vaginosis.  Also more recently her periods have lasted about 2 weeks.  She has felt bad cramps since the IUD has been placed.  She cannot remember to take a birth control pill every day.  She is 38, still smoking about a pack a day.  She is intending to quit soon.  I reviewed with her that Depo-Provera or Nexplanon would be the other forms of contraception I would recommend, given her age and that she is a smoker.  She then says she would like a tubal ligation.  Preop done today on the chance that she could have the procedure scheduled within the next month.    Patient Active Problem List   Diagnosis     Female infertility associated with anovulation     GA (granuloma annulare)     Herpes simplex type 2 infection     Tobacco use disorder     Morbid obesity (H)     Trouble in sleeping     Itchy skin - right breast     Mirena IUD (expires 10/2018)         Current Outpatient Prescriptions:      levonorgestrel (MIRENA) 20 mcg/24 hr (5 years) IUD, 1 each by Intrauterine route once., Disp: , Rfl:      naproxen (NAPROSYN) 500 MG tablet, Take 500 mg by mouth., Disp: , Rfl:     Past Medical History:   Diagnosis Date     Anovulation      Obesity      Tobacco abuse        Past Surgical History:   Procedure Laterality Date     CHOLECYSTECTOMY       History of bleeding: None  History of anesthesia reactions: None    she  reports that she has been smoking Cigarettes.  She has a 15.00 pack-year smoking history. She has never used smokeless tobacco.    Family  History   Problem Relation Age of Onset     Heart disease Mother      Prostate cancer Father      Hypertension Father        Review of Systems:  Constitution: normal  HEENT: normal  Pulmonary: normal  Cardiovascular: normal  GI: normal  : normal, except as noted in HPI  Musculoskeletal: normal  Neuro: normal  Endocrine: normal  Psych: normal  Lymph/Heme: normal     OBJECTIVE:    Allergies:  [place] oil; Black walnut; and Penicillins    Vitals:    09/17/18 1037   BP: 124/72   Pulse: 84   Resp: 20   Temp: 98.1  F (36.7  C)     Body mass index is 45.54 kg/(m^2).    Physical Exam:  Vital signs reviewed  General:          Patient is Alert and oriented x 3, in no apparent distress  Head:   Normocephalic, without obvious abnormality, atraumatic  Eyes:   PERRL, conjunctiva/corneas clear, EOMs intact  ENT:   Normal tympanic membranes and external ear canals, no erythema or exudate in throat  Neck:    No adenopathy, normal ROM  Cardiac:  Regular, normal S1 and S2, no murmur, gallop or rub  Lungs:    Clear to auscultation bilaterally, respirations unlabored  Abdomen:  Soft, non-tender, normal bowel sounds, no masses, no organomegaly  Back:    Symmetric, no curvature, ROM normal  Extremities:   Extremities normal, atraumatic, no cyanosis or edema  Skin:     Skin color, texture, turgor normal, no rashes or lesions  Lymph nodes:   Cervical nodes normal  Neurologic:   CNII-XII intact.   DTRs normal and symmetric.  Symmetric strength and sensation.    ASSESSMENT AND PLAN:    1. Pre-operative Physical for Tubal Ligation.  Low risk procedure, Low risk patient.  Patient tentatively approved for scheduled surgery with the following anesthesia: choice.  Preop done today so that if she does get something scheduled within the next month she can go forward.  She will talk with OB/GYN about appropriate timing for removal of Mirena IUD.  She would likely need a pregnancy test and hemoglobin before surgery.  We could do those as a lab only  visit here, or she could have those done through OB/GYN.  Follow-up as needed.    This dictation uses voice recognition software, which may contain typographical errors.

## 2021-06-26 NOTE — PROGRESS NOTES
"Assessment:      Healthy female exam.    1. Routine general medical examination at a health care facility  Comprehensive Metabolic Panel    Thyroid Stimulating Hormone (TSH)    HM2(CBC w/o Differential)    Vitamin D, Total (25-Hydroxy)    Lipid [place] FASTING   2. Mild episode of recurrent major depressive disorder (H)  FLUoxetine (PROZAC) 20 MG capsule   3. Visit for screening mammogram  Mammo Screening Bilateral   4. Encounter for HCV screening test for low risk patient  Hepatitis C Antibody (Anti-HCV)   5. Screening for human immunodeficiency virus without presence of risk factors  HIV Antigen/Antibody Screening [place]          Plan:       this is a 40 yo female here for physical exam:  1. Depression - discussed - PHQ-9 Total Score: 18 (6/15/2021 10:00 AM)  denies suicidality - but admits that she has challenges - reviewed -   2.  Health Maintenance - due for labs  Discussed recommendation fo rHIV and hepatitis C screening - ordered   Due for breast cancer screening - ordered mammogram     Subjective:      Sejal Ball is a 41 y.o. female who presents for an annual exam.  The patient reports that there is not domestic violence in her life.       No work/life balance -   Working at home last 18 months   Goes back to work in September - 2 days out of office, 3 days at home    Knee was acting up - arthritis -   Was to go back -   After a month it was \"fine\" -   Walking a lot -   When at home, working - sits at desk for hours - knees crack , feet swell  Has mosquito bites - triple antibiotic, calamine, hydrocortisone - helps with the itching   Gets out of pool and bugs attack her feet    Had postpartum depression 12 years ago after daughter was born  Thinks she has anxiety/depression -     Caring for aging parents - mom at [facility] -         Healthy Habits:   Regular Exercise: Yes  Sunscreen Use: Yes  Healthy Diet: No  Dental Visits Regularly: Yes  Seat Belt: Yes  Sexually active: Yes  Self " Breast Exam Monthly:irregular        Immunization History   Administered Date(s) Administered     DTP 1980, 1980, 1980, 1980, 1981     Influenza, inj, historic,unspecified 10/11/2010, 10/10/2011, 10/19/2012, 10/17/2013, 10/13/2014, 10/11/2015, 10/25/2016     Influenza,inj,MDCK,PF,Quad >4yrs 10/09/2017     MMR 10/24/1981, 1992     OPV,Trivalent,Historic(5344-2025 only) 1980, 1980, 1981, 1985     Tdap 2009, 2019     Immunization status: reviewed.    No exam data present    Gynecologic History  Patient's last menstrual period was 2021 (exact date).  Contraception: none  Last Pap: 2018. Results were: normal  Last mammogram: none previous. Results were: na      OB History    Para Term  AB Living   1 1 1         SAB TAB Ectopic Multiple Live Births                  # Outcome Date GA Lbr Anjel/2nd Weight Sex Delivery Anes PTL Lv   1 Term                Current Outpatient Medications   Medication Sig Dispense Refill     FLUoxetine (PROZAC) 20 MG capsule Take 1 capsule (20 mg total) by mouth daily. 30 capsule 2     No current facility-administered medications for this visit.      Past Medical History:   Diagnosis Date     Anovulation      History of anesthesia complications     Some gasping after gallbaldder surgery     Obesity      Tobacco abuse      Past Surgical History:   Procedure Laterality Date     CHOLECYSTECTOMY       CT LAP,TUBAL CAUTERY Bilateral 10/10/2019    Procedure: LAPAROSCOPIC BILATERAL SALPINGECTOMY, LEFT OVARIAN CYSTECTOMY;  Surgeon: Angela Means MD;  Location: [facility];  Service: Gynecology     [place] oil, Black walnut, and Penicillins  Family History   Problem Relation Age of Onset     Heart disease Mother      Prostate cancer Father      Hypertension Father      Breast cancer Cousin      Social History     Socioeconomic History     Marital status: Single     Spouse name: Not on file     Number of  "children: Not on file     Years of education: Not on file     Highest education level: Not on file   Occupational History     Not on file   Social Needs     Financial resource strain: Not on file     Food insecurity     Worry: Not on file     Inability: Not on file     Transportation needs     Medical: Not on file     Non-medical: Not on file   Tobacco Use     Smoking status: Current Every Day Smoker     Packs/day: 1.00     Years: 15.00     Pack years: 15.00     Types: Cigarettes     Smokeless tobacco: Never Used     Tobacco comment: half to almost a pack a day   Substance and Sexual Activity     Alcohol use: Never     Frequency: Never     Drug use: Never     Sexual activity: Not on file     Comment: Mirena   Lifestyle     Physical activity     Days per week: Not on file     Minutes per session: Not on file     Stress: Not on file   Relationships     Social connections     Talks on phone: Not on file     Gets together: Not on file     Attends [employer] service: Not on file     Active member of club or organization: Not on file     Attends meetings of clubs or organizations: Not on file     Relationship status: Not on file     Intimate partner violence     Fear of current or ex partner: Not on file     Emotionally abused: Not on file     Physically abused: Not on file     Forced sexual activity: Not on file   Other Topics Concern     Not on file   Social History Narrative     Not on file       Review of Systems  Review of Systems     Pertinent positives as noted in HPI; otherwise 12 point ROS negative.        Objective:         Vitals:    06/15/21 0953   BP: 138/88   Pulse: 85   Temp: 97.8  F (36.6  C)   TempSrc: Other   Weight: (!) 288 lb (130.6 kg)   Height: 5' 6\" (1.676 m)     Body mass index is 46.48 kg/m .    Physical  Physical Exam    EXAM:  /88 (Patient Site: Right Arm, Patient Position: Sitting, Cuff Size: Adult Large)   Pulse 85   Temp 97.8  F (36.6  C) (Other)   Ht 5' 6\" (1.676 m)   Wt (!) 288 lb " (130.6 kg)   LMP 05/29/2021 (Exact Date)   BMI 46.48 kg/m     Gen:  NAD, appears well, well-hydrated  HEENT:  TMs nl, oropharynx benign, nasal mucosa nl, conjunctiva clear  Neck:  Supple, no adenopathy, no thyromegaly, no carotid bruits, no JVD  Lungs:  Clear to auscultation bilaterally  Breast exam:  No breast lumps, no skin changes, no nipple discharge, no axillary adenopathy  Cor:  RRR no murmur  Abd:  Soft, nontender, BS+, no masses, no guarding or rebound, no HSM  Extr:  Neg.  Neuro:  No asymmetry, Nl motor tone/strength, nl sensation, reflexes =, gait nl, nl coordination, CN intact,   Skin:  Warm/dry

## 2021-06-27 ENCOUNTER — HEALTH MAINTENANCE LETTER (OUTPATIENT)
Age: 41
End: 2021-06-27

## 2021-06-28 NOTE — PROGRESS NOTES
Progress Notes by Dawood Ricks DO at 1/5/2020  2:00 PM     Author: Dawood Ricks DO Service: -- Author Type: Physician    Filed: 1/7/2020 12:12 AM Encounter Date: 1/5/2020 Status: Signed    : Dawood Ricks DO (Physician)       Chief Complaint   Patient presents with   ? Cough     x 1 week, productive yellow cough   ? Sinusitis     History of Present Illness: Rooming staff notes reviewed.  Patient is seen for chief concern of cold symptoms with a cough for about the past week.  No fevers. She has had some chills. She has nasal congestion, and is coughing up yellow phlegm. Treatment of sinusitis with azithromycin has helped in the past.      Review of systems: See history of present illness, all others negative.     Current Outpatient Medications   Medication Sig Dispense Refill   ? ibuprofen (ADVIL,MOTRIN) 600 MG tablet Take 1 tablet (600 mg total) by mouth every 6 (six) hours as needed for pain. 45 tablet 0   ? IBUPROFEN ORAL Take by mouth.     ? levonorgestrel (MIRENA) 20 mcg/24 hr (5 years) IUD 1 each by Intrauterine route once.     ? naproxen (NAPROSYN) 500 MG tablet Take 500 mg by mouth.     ? albuterol (PROAIR HFA;PROVENTIL HFA;VENTOLIN HFA) 90 mcg/actuation inhaler Inhale 2 puffs every 6 (six) hours as needed for wheezing. 1 each 0   ? albuterol (PROAIR HFA;PROVENTIL HFA;VENTOLIN HFA) 90 mcg/actuation inhaler Inhale 2 puffs every 4 (four) hours as needed for wheezing or shortness of breath. 1 each 0   ? azithromycin (ZITHROMAX) 250 MG tablet Take 500 mg (2 x 250 mg tablets) on day 1 followed by 250 mg (1 tablet) on days 2-5. 6 tablet 0   ? benzonatate (TESSALON) 200 MG capsule Take 1 capsule (200 mg total) by mouth 3 (three) times a day as needed. 21 capsule 0   ? HYDROcodone-acetaminophen 5-325 mg per tablet Take 1 tablet by mouth every 4 (four) hours as needed for pain. 18 tablet 0     No current facility-administered medications for this visit.      Past Medical History:   Diagnosis Date   ?  Anovulation    ? History of anesthesia complications     Some gasping after gallbaldder surgery   ? Obesity    ? Tobacco abuse       Past Surgical History:   Procedure Laterality Date   ? CHOLECYSTECTOMY     ? AL LAP,TUBAL CAUTERY Bilateral 10/10/2019    Procedure: LAPAROSCOPIC BILATERAL SALPINGECTOMY, LEFT OVARIAN CYSTECTOMY;  Surgeon: Angela Means MD;  Location: [facility];  Service: Gynecology      Social History     Tobacco Use   ? Smoking status: Current Every Day Smoker     Packs/day: 1.00     Years: 15.00     Pack years: 15.00     Types: Cigarettes   ? Smokeless tobacco: Never Used   ? Tobacco comment: half to almost a pack a day   Substance Use Topics   ? Alcohol use: Never     Frequency: Never   ? Drug use: Never        Family History   Problem Relation Age of Onset   ? Heart disease Mother    ? Prostate cancer Father    ? Hypertension Father        Vitals:    01/05/20 1413   BP: 122/73   Pulse: 86   Resp: 18   Temp: 98.2  F (36.8  C)   TempSrc: Oral   SpO2: 99%   Weight: (!) 288 lb (130.6 kg)       EXAM:   General: Vital signs reviewed.  Patient is in no acute appearing distress with a rare cough.  Breathing appears nonlabored.  Patient is alert and oriented ×3.  ENT: Ear exam shows no tympanic membrane dullness and injection, bilateral nasal turbinates are injected and edematous with purulent rhinorrhea, and bilateral maxillary sinus tenderness, no pharyngeal injection with increased post nasal drainage noted.  Neck: supple with tender adenopathy.  Heart: Normal rate and rhythm without murmur  Lungs: Clear to auscultation with good air flow bilaterally.    Assessment/Plan   1. Acute bacterial sinusitis  azithromycin (ZITHROMAX) 250 MG tablet    albuterol (PROAIR HFA;PROVENTIL HFA;VENTOLIN HFA) 90 mcg/actuation inhaler   2. Cough         Patient Instructions   I think the cough is related to drainage. See hand out for advice.       Patient Education     Sinusitis (Antibiotic  Treatment)    The sinuses are air-filled spaces within the bones of the face. They connect to the inside of the nose. Sinusitis is an inflammation of the tissue that lines the sinuses. Sinusitis can occur during a cold. It can also happen due to allergies to pollens and other particles in the air. Sinusitis can cause symptoms of sinus congestion and a feeling of fullness. A sinus infection causes fever, headache, and facial pain. There is often green or yellow fluid draining from the nose or into the back of the throat (post-nasal drip). You have been given antibiotics to treat this condition.  Home care    Take the full course of antibiotics as instructed. Do not stop taking them, even when you feel better.    Drink plenty of water, hot tea, and other liquids. This may help thin nasal mucus. It also may help your sinuses drain fluids.    Heat may help soothe painful areas of your face. Use a towel soaked in hot water. Or,  the shower and direct the warm spray onto your face. Using a vaporizer along with a menthol rub at night may also help soothe symptoms.     An expectorant with guaifenesin may help thin nasal mucus and help your sinuses drain fluids.    You can use an over-the-counter decongestant, unless a similar medicine was prescribed to you. Nasal sprays work the fastest. Use one that contains phenylephrine or oxymetazoline. First blow your nose gently. Then use the spray. Do not use these medicines more often than directed on the label. If you do, your symptoms may get worse. You may also take pills that contain pseudoephedrine. Dont use products that combine multiple medicines. This is because side effects may be increased. Read labels. You can also ask the pharmacist for help. (People with high blood pressure should not use decongestants. They can raise blood pressure.)    Over-the-counter antihistamines may help if allergies contributed to your sinusitis.      Do not use nasal rinses or  irrigation during an acute sinus infection, unless your healthcare provider tells you to. Rinsing may spread the infection to other areas in your sinuses.    Use acetaminophen or ibuprofen to control pain, unless another pain medicine was prescribed to you. If you have chronic liver or kidney disease or ever had a stomach ulcer, talk with your healthcare provider before using these medicines. (Aspirin should never be taken by anyone under age 18 who is ill with a fever. It may cause severe liver damage.)    Don't smoke. This can make symptoms worse.  Follow-up care  Follow up with your healthcare provider or our staff if you are better in 1 week.  When to seek medical advice  Call your healthcare provider if any of these occur:    Facial pain or headache that gets worse    Stiff neck    Unusual drowsiness or confusion    Swelling of your forehead or eyelids    Vision problems, such as blurred or double vision    Fever of 100.4 F (38 C) or higher, or as directed by your healthcare provider    Seizure    Breathing problems    Symptoms don't go away in 10 days  Prevention  Here are steps you can take to help prevent an infection:    Keep good hand washing habits.    Dont have close contact with people who have sore throats, colds, or other upper respiratory infections.    Dont smoke, and stay away from secondhand smoke.    Stay up to date with of your vaccines.  Date Last Reviewed: 11/1/2017 2000-2017 [employer]. [address] [place] [address], [place] [address]. All rights reserved. This information is not intended as a substitute for professional medical care. Always follow your healthcare professional's instructions.              Dawood Ricks,

## 2021-07-06 VITALS
HEART RATE: 85 BPM | WEIGHT: 288 LBS | HEIGHT: 66 IN | DIASTOLIC BLOOD PRESSURE: 88 MMHG | TEMPERATURE: 97.8 F | BODY MASS INDEX: 46.28 KG/M2 | SYSTOLIC BLOOD PRESSURE: 138 MMHG

## 2021-07-06 ASSESSMENT — PATIENT HEALTH QUESTIONNAIRE - PHQ9: SUM OF ALL RESPONSES TO PHQ QUESTIONS 1-9: 18

## 2021-07-08 ENCOUNTER — COMMUNICATION - HEALTHEAST (OUTPATIENT)
Dept: FAMILY MEDICINE | Facility: CLINIC | Age: 41
End: 2021-07-08

## 2021-07-08 DIAGNOSIS — F33.0 MILD EPISODE OF RECURRENT MAJOR DEPRESSIVE DISORDER (H): ICD-10-CM

## 2021-07-08 ASSESSMENT — ANXIETY QUESTIONNAIRES: GAD7 TOTAL SCORE: 14

## 2021-07-08 NOTE — TELEPHONE ENCOUNTER
Telephone Encounter by Johnny Duffy RN at 7/8/2021 10:40 AM     Author: Johnny Duffy RN Service: -- Author Type: Registered Nurse    Filed: 7/8/2021 10:40 AM Encounter Date: 7/8/2021 Status: Signed    : Johnny Duffy RN (Registered Nurse)       Refilled 6/15/21 with 2 refills.

## 2021-07-16 DIAGNOSIS — F33.0 MILD EPISODE OF RECURRENT MAJOR DEPRESSIVE DISORDER (H): ICD-10-CM

## 2021-07-16 NOTE — TELEPHONE ENCOUNTER
Pending Prescriptions:                       Disp   Refills    FLUoxetine (PROZAC) 20 MG capsule         90 cap*0            Sig: Take 1 capsule (20 mg) by mouth daily    Pharmacy requesting for 90-day refill versus the 30 day refill.

## 2021-08-02 ENCOUNTER — NURSE TRIAGE (OUTPATIENT)
Dept: NURSING | Facility: CLINIC | Age: 41
End: 2021-08-02

## 2021-08-02 NOTE — TELEPHONE ENCOUNTER
Sejal calls and says that she was stung by a bee on Saturday. Pt. Says that the bee stung her behind her right knee on the back of her right leg. Bee sting site is red, hot, and swollen. Afebrile. Denies difficulty breathing or swallowing. Pt. Says that she will go to an  clinic this silverio. COVID 19 Nurse Triage Plan/Patient Instructions    Please be aware that novel coronavirus (COVID-19) may be circulating in the community. If you develop symptoms such as fever, cough, or SOB or if you have concerns about the presence of another infection including coronavirus (COVID-19), please contact your health care provider or visit [email or web address].     Disposition/Instructions    In-Person Visit with provider recommended. Reference Visit Selection Guide.    Thank you for taking steps to prevent the spread of this virus.  o Limit your contact with others.  o Wear a simple mask to cover your cough.  o Wash your hands well and often.    Resources    [facility]: About COVID-19: [email or web address]    CDC: What to Do If You're Sick: www.cdc.gov/coronavirus/2019-ncov/about/steps-when-sick.html    CDC: Ending Home Isolation: www.cdc.gov/coronavirus/2019-ncov/hcp/disposition-in-home-patients.html     CDC: Caring for Someone: www.cdc.gov/coronavirus/2019-ncov/if-you-are-sick/care-for-someone.html     [facility]: Interim Guidance for Hospital Discharge to Home: www.health.[email or web address].[email or web address]/diseases/coronavirus/hcp/hospdischarge.pdf    [facility] clinical trials (COVID-19 research studies): [email or web address].[email or web address]/n-clinical-trials     Below are the COVID-19 hotlines at the [facility] ([facility]). Interpreters are available.   o For health questions: Call 993-917-7767 or 1-336.758.3665 (7 a.m. to 7 p.m.)  o For questions about schools and childcare: Call 117-921-7768 or 1-453.395.7469 (7 a.m. to 7 p.m.)                   Reason for Disposition    [1] Red or very tender (to touch) area  AND [2] started over 24 hours after the sting    Additional Information    Negative: [1] Life-threatening reaction (anaphylaxis) in the past to sting AND [2] < 2 hours since sting    Negative: Attacked by swarm of bees now    Negative: Passed out (i.e., lost consciousness, collapsed and was not responding)    Negative: Wheezing, stridor, or difficulty breathing    Negative: [1] Hoarseness or cough AND [2] sudden onset following sting    Negative: [1] Tightness in the throat or chest AND [2] sudden onset following sting    Negative: [1] Swollen tongue or difficulty swallowing AND [2] sudden onset following sting    Negative: Sounds like a life-threatening emergency to the triager    Negative: Not a bee, wasp, hornet, or yellow jacket sting    Negative: Ring stuck on swollen finger (or toe) following bee sting    Negative: [1] Hives, itching, or swelling elsewhere on body (i.e., not a site of sting) AND [2] started within 2 hours of sting (Exception: only at site of sting)    Negative: [1] Vomiting or abdominal cramps AND [2] started within 2 hours of sting    Negative: [1] Gave epinephrine shot AND [2] no symptoms now    Negative: Sting inside the mouth    Negative: Sting on eyeball (e.g., cornea)    Negative: Patient sounds very sick or weak to the triager    Negative: More than 50 stings    Negative: [1] Fever AND [2] red area    Negative: [1] Fever AND [2] area is very tender to touch    Negative: [1] Red streak or red line AND [2] length > 2 inches (5 cm)    Protocols used: BEE OR YELLOW JACKET STING-A-

## 2021-08-03 ENCOUNTER — OFFICE VISIT (OUTPATIENT)
Dept: FAMILY MEDICINE | Facility: CLINIC | Age: 41
End: 2021-08-03
Payer: COMMERCIAL

## 2021-08-03 VITALS
HEART RATE: 78 BPM | OXYGEN SATURATION: 97 % | SYSTOLIC BLOOD PRESSURE: 132 MMHG | RESPIRATION RATE: 16 BRPM | DIASTOLIC BLOOD PRESSURE: 82 MMHG

## 2021-08-03 DIAGNOSIS — T63.441A BEE STING REACTION, ACCIDENTAL OR UNINTENTIONAL, INITIAL ENCOUNTER: Primary | ICD-10-CM

## 2021-08-03 PROCEDURE — 99213 OFFICE O/P EST LOW 20 MIN: CPT | Performed by: PHYSICIAN ASSISTANT

## 2021-08-03 RX ORDER — ACETAMINOPHEN 500 MG
500-1000 TABLET ORAL EVERY 6 HOURS PRN
COMMUNITY

## 2021-08-03 RX ORDER — PREDNISONE 20 MG/1
40 TABLET ORAL DAILY
Qty: 10 TABLET | Refills: 0 | Status: SHIPPED | OUTPATIENT
Start: 2021-08-03 | End: 2021-08-08

## 2021-08-03 RX ORDER — TRIAMCINOLONE ACETONIDE 1 MG/G
CREAM TOPICAL 2 TIMES DAILY
Qty: 45 G | Refills: 0 | Status: SHIPPED | OUTPATIENT
Start: 2021-08-03 | End: 2021-08-10

## 2021-08-03 RX ORDER — CEPHALEXIN 500 MG/1
500 CAPSULE ORAL 3 TIMES DAILY
Qty: 21 CAPSULE | Refills: 0 | Status: SHIPPED | OUTPATIENT
Start: 2021-08-03 | End: 2021-08-10

## 2021-08-03 NOTE — PATIENT INSTRUCTIONS
Patient was educated on the natural course of condition. Take medications as directed. Conservative measures discussed including over-the-counter antihistamines (Benadryl or Zyrtec or Allegra).  If symptoms do not improve in 2 days then start antibiotic. See your primary care provider if symptoms do not improve in 3 days. Seek emergency care if you develop severe swelling, difficulty swallowing, or difficulty breathing.

## 2021-08-03 NOTE — PROGRESS NOTES
URGENT CARE VISIT:    SUBJECTIVE:   HPI:   Sejal Ball is a 41 year old who presents with rash located over right calf since 3 day(s) ago after getting stung by a bee. Rash is sudden onset and rash seems to be worsening. She describes rash as itching and red. Patient denies difficulty breathing or throat/tongue swelling. Patient has tried hydrocortisone cream and benzoyl peroxide paste with no relief of symptoms. Patient denies previous history of a similar rash. No one around them has had a similar rash.     PMH:   Past Medical History:   Diagnosis Date     Anovulation      History of anesthesia complications     Some gasping after gallbaldder surgery     Obesity      Tobacco abuse      Allergies: Webb oil, Black walnut [black walnut flavor], and Penicillins   Medications:   Current Outpatient Medications   Medication Sig Dispense Refill     acetaminophen (TYLENOL) 500 MG tablet Take 500-1,000 mg by mouth every 6 hours as needed for mild pain       albuterol (PROAIR HFA/PROVENTIL HFA/VENTOLIN HFA) 108 (90 Base) MCG/ACT inhaler INHALE 2 PUFFS BY MOUTH EVERY 6 HOURS AS NEEDED FOR WHEEZE 54 g 1     cephALEXin (KEFLEX) 500 MG capsule Take 1 capsule (500 mg) by mouth 3 times daily for 7 days 21 capsule 0     FLUoxetine (PROZAC) 20 MG capsule Take 1 capsule (20 mg) by mouth daily 90 capsule 0     predniSONE (DELTASONE) 20 MG tablet Take 2 tablets (40 mg) by mouth daily for 5 days 10 tablet 0     triamcinolone (KENALOG) 0.1 % external cream Apply topically 2 times daily for 7 days 45 g 0     Social History:   Social History     Socioeconomic History     Marital status: Single     Spouse name: Not on file     Number of children: Not on file     Years of education: Not on file     Highest education level: Not on file   Occupational History     Not on file   Tobacco Use     Smoking status: Current Every Day Smoker     Packs/day: 1.00     Years: 15.00     Pack years: 15.00     Types: Cigarettes     Smokeless tobacco:  Never Used     Tobacco comment: half to almost a pack a day   Substance and Sexual Activity     Alcohol use: Never     Drug use: Never     Sexual activity: Not on file     Comment: Mirena   Other Topics Concern     Not on file   Social History Narrative     Not on file     Social Determinants of Health     Financial Resource Strain:      Difficulty of Paying Living Expenses:    Food Insecurity:      Worried About Running Out of Food in the Last Year:      Ran Out of Food in the Last Year:    Transportation Needs:      Lack of Transportation (Medical):      Lack of Transportation (Non-Medical):    Physical Activity:      Days of Exercise per Week:      Minutes of Exercise per Session:    Stress:      Feeling of Stress :    Social Connections:      Frequency of Communication with Friends and Family:      Frequency of Social Gatherings with Friends and Family:      Attends [employer] Services:      Active Member of Clubs or Organizations:      Attends Club or Organization Meetings:      Marital Status:    Intimate Partner Violence:      Fear of Current or Ex-Partner:      Emotionally Abused:      Physically Abused:      Sexually Abused:        ROS:   General: negative  Skin: rash  Eyes: negative  Ears/Nose/Throat: negative  Respiratory: No shortness of breath, dyspnea on exertion, cough, or hemoptysis  Cardiovascular: negative  Gastrointestinal: negative  Musculoskeletal: right calf pain  Neurologic: negative    OBJECTIVE:  /82   Pulse 78   Resp 16   LMP 07/30/2021   SpO2 97%   General: WDWN in NAD.   Eyes: Non-injected conjunctivas without drainage bilaterally.  Ears: Bilateral TMs are easily visualized without erythema, injection, or effusion. No erythema or edema of external canals.    Oropharynx: No erythema of oropharynx. No edema of tongue.   Cardiac: RRR without murmurs, rubs, or gallops.  Respiratory: LCTAB without adventitious sounds. Non-labored breathing.  Integumentary:   Distribution: localized   Location: right calf    Color: red,  Lesion type: macular, round with warmth  Neuro: Alert and oriented.         ASSESSMENT:     ICD-10-CM    1. Bee sting reaction, accidental or unintentional, initial encounter  T63.441A predniSONE (DELTASONE) 20 MG tablet     triamcinolone (KENALOG) 0.1 % external cream     cephALEXin (KEFLEX) 500 MG capsule        PLAN:  Patient Instructions   Patient was educated on the natural course of condition. Take medications as directed. Conservative measures discussed including over-the-counter antihistamines (Benadryl or Zyrtec or Allegra).  If symptoms do not improve in 2 days then start antibiotic. See your primary care provider if symptoms do not improve in 3 days. Seek emergency care if you develop severe swelling, difficulty swallowing, or difficulty breathing.     Patient verbalized understanding and is agreeable to plan. The patient was discharged ambulatory and in stable condition.    Vickie Wyatt PA-C on 8/3/2021 at 3:05 PM

## 2021-10-17 ENCOUNTER — HEALTH MAINTENANCE LETTER (OUTPATIENT)
Age: 41
End: 2021-10-17

## 2021-12-01 DIAGNOSIS — J98.01 BRONCHOSPASM: ICD-10-CM

## 2021-12-03 RX ORDER — ALBUTEROL SULFATE 90 UG/1
AEROSOL, METERED RESPIRATORY (INHALATION)
Qty: 18 G | Refills: 1 | Status: SHIPPED | OUTPATIENT
Start: 2021-12-03 | End: 2022-02-17

## 2021-12-03 NOTE — TELEPHONE ENCOUNTER
"Routing refill request to provider for review/approval because:  Early refill requested.    Last Written Prescription Date:  7/12/21  Last Fill Quantity: 54 g,  # refills: 1   Last office visit provider:  6/15/21     Requested Prescriptions   Pending Prescriptions Disp Refills     albuterol (PROAIR HFA/PROVENTIL HFA/VENTOLIN HFA) 108 (90 Base) MCG/ACT inhaler [Pharmacy Med Name: ALBUTEROL HFA (PROAIR) INHALER] 18 g 1     Sig: INHALE 2 PUFFS BY MOUTH EVERY 6 HOURS AS NEEDED FOR WHEEZING       Asthma Maintenance Inhalers - Anticholinergics Passed - 12/1/2021  9:45 AM        Passed - Patient is age 12 years or older        Passed - Recent (12 mo) or future (30 days) visit within the authorizing provider's specialty     Patient has had an office visit with the authorizing provider or a provider within the authorizing providers department within the previous 12 mos or has a future within next 30 days. See \"Patient Info\" tab in inbasket, or \"Choose Columns\" in Meds & Orders section of the refill encounter.              Passed - Medication is active on med list       Short-Acting Beta Agonist Inhalers Protocol  Passed - 12/1/2021  9:45 AM        Passed - Patient is age 12 or older        Passed - Recent (12 mo) or future (30 days) visit within the authorizing provider's specialty     Patient has had an office visit with the authorizing provider or a provider within the authorizing providers department within the previous 12 mos or has a future within next 30 days. See \"Patient Info\" tab in inbasket, or \"Choose Columns\" in Meds & Orders section of the refill encounter.              Passed - Medication is active on med list             Johnny Duffy RN 12/03/21 8:10 AM  "

## 2022-02-15 DIAGNOSIS — J98.01 BRONCHOSPASM: ICD-10-CM

## 2022-02-17 RX ORDER — ALBUTEROL SULFATE 90 UG/1
AEROSOL, METERED RESPIRATORY (INHALATION)
Qty: 18 G | Refills: 1 | Status: SHIPPED | OUTPATIENT
Start: 2022-02-17 | End: 2022-05-10

## 2022-02-17 NOTE — TELEPHONE ENCOUNTER
"Last Written Prescription Date:  12/3/2021  Last Fill Quantity: 18 g,  # refills: 1   Last office visit provider:  6/15/2021 Dr. Vazquez     Requested Prescriptions   Pending Prescriptions Disp Refills     albuterol (PROAIR HFA/PROVENTIL HFA/VENTOLIN HFA) 108 (90 Base) MCG/ACT inhaler [Pharmacy Med Name: ALBUTEROL HFA (PROAIR) INHALER] 18 g 1     Sig: TAKE 2 PUFFS BY MOUTH EVERY 6 HOURS AS NEEDED FOR WHEEZE       Asthma Maintenance Inhalers - Anticholinergics Passed - 2/15/2022  9:56 PM        Passed - Patient is age 12 years or older        Passed - Recent (12 mo) or future (30 days) visit within the authorizing provider's specialty     Patient has had an office visit with the authorizing provider or a provider within the authorizing providers department within the previous 12 mos or has a future within next 30 days. See \"Patient Info\" tab in inbasket, or \"Choose Columns\" in Meds & Orders section of the refill encounter.              Passed - Medication is active on med list       Short-Acting Beta Agonist Inhalers Protocol  Passed - 2/15/2022  9:56 PM        Passed - Patient is age 12 or older        Passed - Recent (12 mo) or future (30 days) visit within the authorizing provider's specialty     Patient has had an office visit with the authorizing provider or a provider within the authorizing providers department within the previous 12 mos or has a future within next 30 days. See \"Patient Info\" tab in inbasket, or \"Choose Columns\" in Meds & Orders section of the refill encounter.              Passed - Medication is active on med list             Fernanda Flores RN 02/17/22 4:24 PM  "

## 2022-05-10 ENCOUNTER — OFFICE VISIT (OUTPATIENT)
Dept: FAMILY MEDICINE | Facility: CLINIC | Age: 42
End: 2022-05-10
Payer: COMMERCIAL

## 2022-05-10 VITALS
SYSTOLIC BLOOD PRESSURE: 134 MMHG | DIASTOLIC BLOOD PRESSURE: 89 MMHG | HEART RATE: 72 BPM | HEIGHT: 66 IN | TEMPERATURE: 98.3 F | BODY MASS INDEX: 47.09 KG/M2 | WEIGHT: 293 LBS | OXYGEN SATURATION: 97 %

## 2022-05-10 DIAGNOSIS — G25.81 RESTLESS LEGS SYNDROME: ICD-10-CM

## 2022-05-10 DIAGNOSIS — F33.0 MILD EPISODE OF RECURRENT MAJOR DEPRESSIVE DISORDER (H): Primary | ICD-10-CM

## 2022-05-10 DIAGNOSIS — J98.01 BRONCHOSPASM: ICD-10-CM

## 2022-05-10 LAB
ERYTHROCYTE [DISTWIDTH] IN BLOOD BY AUTOMATED COUNT: 14.1 % (ref 10–15)
FERRITIN SERPL-MCNC: 11 NG/ML (ref 10–130)
HCT VFR BLD AUTO: 39.3 % (ref 35–47)
HGB BLD-MCNC: 12.4 G/DL (ref 11.7–15.7)
IRON SATN MFR SERPL: 13 % (ref 15–46)
IRON SERPL-MCNC: 48 UG/DL (ref 35–180)
MCH RBC QN AUTO: 27.2 PG (ref 26.5–33)
MCHC RBC AUTO-ENTMCNC: 31.6 G/DL (ref 31.5–36.5)
MCV RBC AUTO: 86 FL (ref 78–100)
PLATELET # BLD AUTO: 273 10E3/UL (ref 150–450)
RBC # BLD AUTO: 4.56 10E6/UL (ref 3.8–5.2)
TIBC SERPL-MCNC: 383 UG/DL (ref 240–430)
WBC # BLD AUTO: 7.8 10E3/UL (ref 4–11)

## 2022-05-10 PROCEDURE — 83550 IRON BINDING TEST: CPT | Performed by: FAMILY MEDICINE

## 2022-05-10 PROCEDURE — 36415 COLL VENOUS BLD VENIPUNCTURE: CPT | Performed by: FAMILY MEDICINE

## 2022-05-10 PROCEDURE — 82728 ASSAY OF FERRITIN: CPT | Performed by: FAMILY MEDICINE

## 2022-05-10 PROCEDURE — 85027 COMPLETE CBC AUTOMATED: CPT | Performed by: FAMILY MEDICINE

## 2022-05-10 PROCEDURE — 99214 OFFICE O/P EST MOD 30 MIN: CPT | Performed by: FAMILY MEDICINE

## 2022-05-10 RX ORDER — FLUOXETINE 40 MG/1
40 CAPSULE ORAL DAILY
Qty: 90 CAPSULE | Refills: 1 | Status: SHIPPED | OUTPATIENT
Start: 2022-05-10 | End: 2022-11-10

## 2022-05-10 RX ORDER — ALBUTEROL SULFATE 90 UG/1
2 AEROSOL, METERED RESPIRATORY (INHALATION) EVERY 4 HOURS PRN
Qty: 18 G | Refills: 1 | Status: SHIPPED | OUTPATIENT
Start: 2022-05-10 | End: 2022-06-28

## 2022-05-10 RX ORDER — ROPINIROLE 0.5 MG/1
.5-1 TABLET, FILM COATED ORAL AT BEDTIME
Qty: 100 TABLET | Refills: 1 | Status: SHIPPED | OUTPATIENT
Start: 2022-05-10 | End: 2023-08-21

## 2022-05-10 ASSESSMENT — PATIENT HEALTH QUESTIONNAIRE - PHQ9
SUM OF ALL RESPONSES TO PHQ QUESTIONS 1-9: 14
10. IF YOU CHECKED OFF ANY PROBLEMS, HOW DIFFICULT HAVE THESE PROBLEMS MADE IT FOR YOU TO DO YOUR WORK, TAKE CARE OF THINGS AT HOME, OR GET ALONG WITH OTHER PEOPLE: VERY DIFFICULT
SUM OF ALL RESPONSES TO PHQ QUESTIONS 1-9: 14

## 2022-05-10 ASSESSMENT — ANXIETY QUESTIONNAIRES
8. IF YOU CHECKED OFF ANY PROBLEMS, HOW DIFFICULT HAVE THESE MADE IT FOR YOU TO DO YOUR WORK, TAKE CARE OF THINGS AT HOME, OR GET ALONG WITH OTHER PEOPLE?: SOMEWHAT DIFFICULT
2. NOT BEING ABLE TO STOP OR CONTROL WORRYING: NOT AT ALL
4. TROUBLE RELAXING: SEVERAL DAYS
GAD7 TOTAL SCORE: 6
6. BECOMING EASILY ANNOYED OR IRRITABLE: SEVERAL DAYS
GAD7 TOTAL SCORE: 6
7. FEELING AFRAID AS IF SOMETHING AWFUL MIGHT HAPPEN: NOT AT ALL
GAD7 TOTAL SCORE: 6
5. BEING SO RESTLESS THAT IT IS HARD TO SIT STILL: SEVERAL DAYS
7. FEELING AFRAID AS IF SOMETHING AWFUL MIGHT HAPPEN: NOT AT ALL
3. WORRYING TOO MUCH ABOUT DIFFERENT THINGS: SEVERAL DAYS
1. FEELING NERVOUS, ANXIOUS, OR ON EDGE: MORE THAN HALF THE DAYS

## 2022-05-10 NOTE — PROGRESS NOTES
1. Mild episode of recurrent major depressive disorder (H)  This patient continues to have difficulty with depression symptoms - discussed dosing - will increase Fluoxetine to 40 mg daily.    - FLUoxetine (PROZAC) 40 MG capsule; Take 1 capsule (40 mg) by mouth daily Change in dose 5/10/2022  Dispense: 90 capsule; Refill: 1    2. Restless legs syndrome  Patient describes leg movements - discussed iron deficiency contributing - check labs; treat with Ropinirole  - Iron and iron binding capacity; Future  - Ferritin; Futbure  - CBC with platelets; Future  - rOPINIRole (REQUIP) 0.5 MG tablet; Take 1-2 tablets (0.5-1 mg) by mouth At Bedtime For restless legs  Dispense: 100 tablet; Refill: 1  - Iron and iron binding capacity  - Ferritin  - CBC with platelets    3. Bronchospasm  H/o bronchospasm - refill Albuterol  - albuterol (PROAIR HFA/PROVENTIL HFA/VENTOLIN HFA) 108 (90 Base) MCG/ACT inhaler; Inhale 2 puffs into the lungs every 4 hours as needed for shortness of breath / dyspnea or wheezing  Dispense: 18 g; Refill: 1      Chief Complaint   Patient presents with     Mental Health Problem   '    Subjective   Sejal is a 41 year old who presents for the following health issues     Work was stressful in last year -   Didn't have help   No motivation to do stuff  Wants to be left alone   Taking Fluoxetine -   Trouble sleeping -   Years ago - had arthritis in knee/ankle - when damp - really feels it  Has plantar faciitis -   Wearing plantar fasciitis arch supports from [employer] -   Legs feel restless at night   Calf of right leg -   When doesn' take a pill, feels jiang       History of Present Illness       Mental Health Follow-up:  Patient presents to follow-up on Depression & Anxiety.Patient's depression since last visit has been:  Medium  The patient is not having other symptoms associated with depression.  Patient's anxiety since last visit has been:  Bad  The patient is not having other symptoms associated with  anxiety.  Any significant life events: No  Patient is not feeling anxious or having panic attacks.  Patient has no concerns about alcohol or drug use.       Today's PHQ-9         PHQ-9 Total Score: 14  PHQ-9 Q9 Thoughts of better off dead/self-harm past 2 weeks :   (P) Not at all    How difficult have these problems made it for you to do your work, take care of things at home, or get along with other people: Very difficult    Today's TRACI-7 Score: 6    She eats 2-3 servings of fruits and vegetables daily.She consumes 2 sweetened beverage(s) daily.She exercises with enough effort to increase her heart rate 20 to 29 minutes per day.  She exercises with enough effort to increase her heart rate 3 or less days per week. She is missing 3 dose(s) of medications per week.      Depression Screening Follow Up    PHQ 5/10/2022   PHQ-9 Total Score 14   Q9: Thoughts of better off dead/self-harm past 2 weeks Not at all     Last PHQ-9 5/10/2022   1.  Little interest or pleasure in doing things 2   2.  Feeling down, depressed, or hopeless 2   3.  Trouble falling or staying asleep, or sleeping too much 2   4.  Feeling tired or having little energy 2   5.  Poor appetite or overeating 1   6.  Feeling bad about yourself 2   7.  Trouble concentrating 1   8.  Moving slowly or restless 2   Q9: Thoughts of better off dead/self-harm past 2 weeks 0   PHQ-9 Total Score 14   Difficulty at work, home, or with people -       Follow Up Actions Taken  Crisis resource information provided in After Visit Summary  Patient counseled, no additional follow up at this time.                 Review of Systems   Constitutional: Negative for chills and fever.   HENT: Negative for congestion and sore throat.    Eyes: Negative for visual disturbance.   Respiratory: Positive for wheezing (rare wheezing). Negative for cough and shortness of breath.    Cardiovascular: Negative for chest pain and palpitations.   Gastrointestinal: Negative for abdominal pain.  "  Endocrine: Negative.  Negative for polydipsia and polyuria.   Breasts:  negative.    Genitourinary: Negative.    Musculoskeletal:        Leg movements especially at night/bedtime     Skin: Negative.    Allergic/Immunologic: Negative.    Neurological: Negative.    Hematological: Negative.  Does not bruise/bleed easily.   Psychiatric/Behavioral:        Depression/low mood/stress            Objective    /89 (BP Location: Right arm, Patient Position: Sitting, Cuff Size: Adult Large)   Pulse 72   Temp 98.3  F (36.8  C)   Ht 1.684 m (5' 6.3\")   Wt 133.4 kg (294 lb)   LMP 04/30/2022 (Exact Date)   SpO2 97%   BMI 47.03 kg/m    Body mass index is 47.03 kg/m .  Physical Exam  Constitutional:       General: She is not in acute distress.     Appearance: She is well-developed.   HENT:      Right Ear: Tympanic membrane and external ear normal.      Left Ear: Tympanic membrane and external ear normal.      Nose: Nose normal.      Mouth/Throat:      Pharynx: No oropharyngeal exudate.   Eyes:      General:         Right eye: No discharge.         Left eye: No discharge.      Conjunctiva/sclera: Conjunctivae normal.      Pupils: Pupils are equal, round, and reactive to light.   Neck:      Thyroid: No thyromegaly.      Trachea: No tracheal deviation.   Cardiovascular:      Rate and Rhythm: Normal rate and regular rhythm.      Pulses: Normal pulses.      Heart sounds: Normal heart sounds, S1 normal and S2 normal. No murmur heard.    No friction rub. No S3 or S4 sounds.   Pulmonary:      Effort: Pulmonary effort is normal. No respiratory distress.      Breath sounds: Normal breath sounds. No wheezing or rales.   Abdominal:      General: Bowel sounds are normal.      Palpations: Abdomen is soft. There is no mass.      Tenderness: There is no abdominal tenderness.   Musculoskeletal:         General: Normal range of motion.      Cervical back: Neck supple.   Lymphadenopathy:      Cervical: No cervical adenopathy.   Skin:     " General: Skin is warm and dry.      Findings: No rash.   Neurological:      General: No focal deficit present.      Mental Status: She is alert and oriented to person, place, and time.      Motor: No abnormal muscle tone.      Deep Tendon Reflexes: Reflexes are normal and symmetric.   Psychiatric:         Thought Content: Thought content normal.         Judgment: Judgment normal.            Results for orders placed or performed in visit on 05/10/22   Iron and iron binding capacity     Status: Abnormal   Result Value Ref Range    Iron 48 35 - 180 ug/dL    Iron Binding Capacity 383 240 - 430 ug/dL    Iron Sat Index 13 (L) 15 - 46 %   Ferritin     Status: Normal   Result Value Ref Range    Ferritin 11 10 - 130 ng/mL   CBC with platelets     Status: Normal   Result Value Ref Range    WBC Count 7.8 4.0 - 11.0 10e3/uL    RBC Count 4.56 3.80 - 5.20 10e6/uL    Hemoglobin 12.4 11.7 - 15.7 g/dL    Hematocrit 39.3 35.0 - 47.0 %    MCV 86 78 - 100 fL    MCH 27.2 26.5 - 33.0 pg    MCHC 31.6 31.5 - 36.5 g/dL    RDW 14.1 10.0 - 15.0 %    Platelet Count 273 150 - 450 10e3/uL

## 2022-05-11 ASSESSMENT — PATIENT HEALTH QUESTIONNAIRE - PHQ9: SUM OF ALL RESPONSES TO PHQ QUESTIONS 1-9: 14

## 2022-05-11 ASSESSMENT — ANXIETY QUESTIONNAIRES: GAD7 TOTAL SCORE: 6

## 2022-05-22 ASSESSMENT — ENCOUNTER SYMPTOMS
SHORTNESS OF BREATH: 0
NEUROLOGICAL NEGATIVE: 1
POLYDIPSIA: 0
FEVER: 0
CHILLS: 0
PALPITATIONS: 0
ABDOMINAL PAIN: 0
SORE THROAT: 0
HEMATOLOGIC/LYMPHATIC NEGATIVE: 1
COUGH: 0
ALLERGIC/IMMUNOLOGIC NEGATIVE: 1
ENDOCRINE NEGATIVE: 1
BRUISES/BLEEDS EASILY: 0
WHEEZING: 1

## 2022-06-03 DIAGNOSIS — G25.81 RESTLESS LEGS SYNDROME: ICD-10-CM

## 2022-06-04 RX ORDER — ROPINIROLE 0.5 MG/1
.5-1 TABLET, FILM COATED ORAL AT BEDTIME
Qty: 60 TABLET | Refills: 3 | OUTPATIENT
Start: 2022-06-04

## 2022-06-28 DIAGNOSIS — J98.01 BRONCHOSPASM: ICD-10-CM

## 2022-06-28 RX ORDER — ALBUTEROL SULFATE 90 UG/1
2 AEROSOL, METERED RESPIRATORY (INHALATION) EVERY 4 HOURS PRN
Qty: 18 G | Refills: 3 | Status: SHIPPED | OUTPATIENT
Start: 2022-06-28 | End: 2023-08-21

## 2022-06-29 DIAGNOSIS — G25.81 RESTLESS LEGS SYNDROME: ICD-10-CM

## 2022-06-29 NOTE — TELEPHONE ENCOUNTER
"Last Written Prescription Date:  5/10/22  Last Fill Quantity: 18,  # refills: 1   Last office visit provider:  5/1/22     Requested Prescriptions   Pending Prescriptions Disp Refills     albuterol (PROAIR HFA/PROVENTIL HFA/VENTOLIN HFA) 108 (90 Base) MCG/ACT inhaler [Pharmacy Med Name: ALBUTEROL HFA (PROAIR) INHALER]  1     Sig: INHALE 2 PUFFS INTO THE LUNGS EVERY 4 HOURS AS NEEDED FOR SHORTNESS OF BREATH / DYSPNEA OR WHEEZING       Asthma Maintenance Inhalers - Anticholinergics Passed - 6/28/2022 12:40 AM        Passed - Patient is age 12 years or older        Passed - Recent (12 mo) or future (30 days) visit within the authorizing provider's specialty     Patient has had an office visit with the authorizing provider or a provider within the authorizing providers department within the previous 12 mos or has a future within next 30 days. See \"Patient Info\" tab in inbasket, or \"Choose Columns\" in Meds & Orders section of the refill encounter.              Passed - Medication is active on med list       Short-Acting Beta Agonist Inhalers Protocol  Passed - 6/28/2022 12:40 AM        Passed - Patient is age 12 or older        Passed - Recent (12 mo) or future (30 days) visit within the authorizing provider's specialty     Patient has had an office visit with the authorizing provider or a provider within the authorizing providers department within the previous 12 mos or has a future within next 30 days. See \"Patient Info\" tab in inbasket, or \"Choose Columns\" in Meds & Orders section of the refill encounter.              Passed - Medication is active on med list             Beena Graff RN 06/28/22 8:28 PM  "

## 2022-06-30 RX ORDER — ROPINIROLE 0.5 MG/1
.5-1 TABLET, FILM COATED ORAL AT BEDTIME
Qty: 60 TABLET | Refills: 3 | OUTPATIENT
Start: 2022-06-30

## 2022-07-24 ENCOUNTER — HEALTH MAINTENANCE LETTER (OUTPATIENT)
Age: 42
End: 2022-07-24

## 2022-10-02 ENCOUNTER — HEALTH MAINTENANCE LETTER (OUTPATIENT)
Age: 42
End: 2022-10-02

## 2022-11-08 DIAGNOSIS — F33.0 MILD EPISODE OF RECURRENT MAJOR DEPRESSIVE DISORDER (H): ICD-10-CM

## 2022-11-10 NOTE — TELEPHONE ENCOUNTER
"Routing refill request to provider for review/approval because:  PHQ9 score - greater than 4.  Patient needs to be seen because it has been more than 6 months since last office visit.    Last Written Prescription Date:  5/10/22  Last Fill Quantity: 90,  # refills: 1   Last office visit provider:  5/10/22     Requested Prescriptions   Pending Prescriptions Disp Refills     FLUoxetine (PROZAC) 40 MG capsule [Pharmacy Med Name: FLUOXETINE HCL 40 MG CAPSULE] 90 capsule 1     Sig: TAKE 1 CAPSULE (40 MG) BY MOUTH DAILY       SSRIs Protocol Failed - 11/8/2022 12:41 AM        Failed - PHQ-9 score less than 5 in past 6 months     Please review last PHQ-9 score.           Failed - Recent (6 mo) or future (30 days) visit within the authorizing provider's specialty     Patient had office visit in the last 6 months or has a visit in the next 30 days with authorizing provider or within the authorizing provider's specialty.  See \"Patient Info\" tab in inbasket, or \"Choose Columns\" in Meds & Orders section of the refill encounter.            Passed - Medication is active on med list        Passed - Patient is age 18 or older        Passed - No active pregnancy on record        Passed - No positive pregnancy test in last 12 months             Johnny Duffy RN 11/10/22 10:22 AM  "

## 2022-11-14 RX ORDER — FLUOXETINE 40 MG/1
40 CAPSULE ORAL DAILY
Qty: 90 CAPSULE | Refills: 0 | Status: SHIPPED | OUTPATIENT
Start: 2022-11-14 | End: 2023-03-28

## 2023-08-12 ENCOUNTER — OFFICE VISIT (OUTPATIENT)
Dept: FAMILY MEDICINE | Facility: CLINIC | Age: 43
End: 2023-08-12
Payer: COMMERCIAL

## 2023-08-12 ENCOUNTER — HEALTH MAINTENANCE LETTER (OUTPATIENT)
Age: 43
End: 2023-08-12

## 2023-08-12 VITALS
WEIGHT: 289 LBS | TEMPERATURE: 98 F | RESPIRATION RATE: 16 BRPM | DIASTOLIC BLOOD PRESSURE: 87 MMHG | SYSTOLIC BLOOD PRESSURE: 134 MMHG | BODY MASS INDEX: 46.23 KG/M2 | HEART RATE: 82 BPM | OXYGEN SATURATION: 98 %

## 2023-08-12 DIAGNOSIS — S61.209A AVULSION OF FINGERTIP, INITIAL ENCOUNTER: Primary | ICD-10-CM

## 2023-08-12 PROCEDURE — 99214 OFFICE O/P EST MOD 30 MIN: CPT | Performed by: FAMILY MEDICINE

## 2023-08-12 RX ORDER — CEPHALEXIN 500 MG/1
500 CAPSULE ORAL 2 TIMES DAILY
Qty: 14 CAPSULE | Refills: 0 | Status: SHIPPED | OUTPATIENT
Start: 2023-08-12 | End: 2023-08-19

## 2023-08-12 RX ORDER — MUPIROCIN 20 MG/G
OINTMENT TOPICAL 3 TIMES DAILY
Qty: 22 G | Refills: 0 | Status: SHIPPED | OUTPATIENT
Start: 2023-08-12 | End: 2023-09-11

## 2023-08-12 NOTE — PROGRESS NOTES
ASSESSMENT/PLAN:      ICD-10-CM    1. Avulsion of fingertip, initial encounter  S61.209A cephALEXin (KEFLEX) 500 MG capsule     Laceration repair     DISCONTINUED: mupirocin (BACTROBAN) 2 % external ointment    tip of the DIP of the right fourth digit, >12 hours after injuyr, tissue too thin suture, steri strips applied, will heal by secondary intention          Patient Instructions         Follow if your symptoms worsen in any way.     Follow up with your primary care provider or clinic in about 2-3 days  if your symptoms do not improve     Keep dressing over finger, clean and dry until Wednesday, 8/16/2023            Reviewed medication instructions and side effects. Follow up if experiences side effects.     I reviewed supportive care, otc meds to use if needed, expected course, and signs of concern.  Follow up as needed or if she does not improve within  1-2 days or if worsens in any way.  Reviewed red flag symptoms and is to go to the ER if experiences any of these.     The use of Dragon/DeciZium dictation services may have been used to construct the content in this note; any grammatical or spelling errors are non-intentional. Please contact the author of this note directly if you are in need of any clarification.      On the day of the encounter, time spend on chart review, patient visit, review of testing, documentation was 30  minutes              Patient presents with:  finger cut: Right tip of ring finger pt sliced her finger tip with the blade last night. Pt. Has not taken any pain medication.       Subjective     Sejal Ball is a 43 year old female who presents to clinic today for the following health issues:    HPI     Laceration    Mechanism of injury: blade of  -tip of blade, washing the blade, used to prep zucchini to make brownies, able to stop bleeding with pressure, but continued to ooze last night this am   History provided by: Patient  Time of injury was 9 pm last night    This is a non-work related injury.    Associated symptoms: Denies numbness, weakness, or loss of function    Last tetanus booster within 10 years: Yes 8/7/2019    LACERATION EXAM:   Size of laceration: 1 centimeters  Characteristics of the laceration: very thin flap of tissue,   Depth of laceration: shallow divot of tissue excised from tip  Tendon function intact: Yes  Sensation to light touch intact: Yes  Pulses/capillary refill intact: Yes  Foreign body: No    Picture included in patient's chart: no    PROCEDURE NOTE:  Anesthesia: None  Prepped and draped in the usual sterile fashion  Wound cleaned with soap and water   Wound was explored for any foreign bodies and evaluated for tendon, nerve, vessel or joint involvement.    Closure was steri strips   Laceration was closed with Steri-strips with benzoin in usual fashion   Bandage was applied  Patient tolerated the procedure well            Past Medical History:   Diagnosis Date    Anovulation     History of anesthesia complications     Some gasping after gallbaldder surgery    Obesity     Tobacco abuse      Social History     Tobacco Use    Smoking status: Every Day     Packs/day: 1.00     Years: 15.00     Pack years: 15.00     Types: Cigarettes    Smokeless tobacco: Never    Tobacco comments:     half to almost a pack a day   Substance Use Topics    Alcohol use: Never       Current Outpatient Medications   Medication Sig Dispense Refill    acetaminophen (TYLENOL) 500 MG tablet Take 500-1,000 mg by mouth every 6 hours as needed for mild pain      FLUoxetine (PROZAC) 40 MG capsule TAKE 1 CAPSULE BY MOUTH EVERY DAY 90 capsule 0    albuterol (PROAIR HFA/PROVENTIL HFA/VENTOLIN HFA) 108 (90 Base) MCG/ACT inhaler INHALE 2 PUFFS INTO THE LUNGS EVERY 4 HOURS AS NEEDED FOR SHORTNESS OF BREATH / DYSPNEA OR WHEEZING 8.5 g 0    hydrOXYzine (VISTARIL) 50 MG capsule Take 1 capsule (50 mg) by mouth 3 times daily as needed for anxiety 60 capsule 3    rOPINIRole (REQUIP) 0.5 MG  tablet TAKE 1-2 TABLETS BY MOUTH AT BEDTIME FOR RESTLESS LEGS 60 tablet 0     Allergies   Allergen Reactions    Hart Oil Other (See Comments)     Tongue tingling    [place] [[place] Flavor] Hives     Other nuts do not bother patient    Penicillins Hives             ROS are negative, except as otherwise noted HPI      Objective    /87 (BP Location: Right arm, Patient Position: Sitting, Cuff Size: Adult Large)   Pulse 82   Temp 98  F (36.7  C) (Oral)   Resp 16   Wt 131.1 kg (289 lb)   SpO2 98%   BMI 46.23 kg/m    Body mass index is 46.23 kg/m .  Physical Exam   GENERAL: healthy, alert and no distress  MS:Right hand fourth finger -tip of the DIP small very thin flap of tissue with shallow divot of tissue excised from the tip of th DIP, tiny spots of blood oozing, normal sensation, FROM of DIP/PIP, mild swelling of tip of the DIP, no erythema or streaking, no pus  NEURO: Normal strength and tone, mentation intact and speech normal       Diagnostic Test Results:  Labs reviewed in Epic  No results found for any visits on 08/12/23.

## 2023-08-12 NOTE — PATIENT INSTRUCTIONS
Follow if your symptoms worsen in any way.     Follow up with your primary care provider or clinic in about 2-3 days  if your symptoms do not improve     Keep dressing over finger, clean and dry until Wednesday, 8/16/2023

## 2023-08-13 ENCOUNTER — NURSE TRIAGE (OUTPATIENT)
Dept: NURSING | Facility: CLINIC | Age: 43
End: 2023-08-13
Payer: COMMERCIAL

## 2023-08-13 NOTE — TELEPHONE ENCOUNTER
"Pt calls to reports \"steri-strips fell off.\"  Applied in clinic yesterday to finger avulsion injury.  No new bleeding.  Wound still intact.  Therefore advised applying fresh bandaging to cover total injured area (such as butterfly bandage).  Pt reports having butterfly bandaids, newly purchased, along with antibiotic ointment.  Do not attempt to re-apply old steri-strips which by now have been overly handled.  Continue with new bandaging as instructed, changing as needed if becomes wet or soiled.  Continue oral antibiotic as well.  Call back if no improvement or any worsened pain, swelling or signs of infection.  Pt verbalizes understanding.  Agrees to plan.    [facility] Nurse Advisor     Reason for Disposition   Minor cut or scratch     Already evaluated at urgent care.  Prescribed oral antibiotic, topical antibiotic ointment and steri-strips applied.    Additional Information   Negative: [1] Major bleeding (e.g., actively dripping or spurting) AND [2] can't be stopped   Negative: Amputation   Negative: Shock suspected (e.g., cold/pale/clammy skin, too weak to stand, low BP, rapid pulse)   Negative: [1] Knife wound (or other possibly deep cut) AND [2] to chest, abdomen, back, neck, or head   Negative: [1] Self-injury (e.g., cutting, self-harm) AND [2] suicidal or out-of-control   Negative: Sounds like a life-threatening emergency to the triager   Negative: [1] Animal bite AND [2] broken skin   Negative: [1] Human bite AND [2] broken skin   Negative: Puncture wound   Negative: [1] Bleeding AND [2] won't stop after 10 minutes of direct pressure (using correct technique)   Negative: Skin is split open or gaping (or length > 1/2 inch or 12 mm on the skin, 1/4 inch or 6 mm on the face)   Negative: [1] Deep cut AND [2] can see bone or tendons   Negative: Cut over knuckle (MCP joint)   Negative: Sensation of something in the wound (i.e., retained object in wound)   Negative: [1] Dirt in the wound AND [2] not " removed with 15 minutes of scrubbing   Negative: Wound causes numbness (i.e., loss of sensation)   Negative: Wound causes weakness (i.e., decreased ability to move hand, finger, toe)   Negative: [1] SEVERE pain AND [2] not improved 2 hours after pain medicine   Negative: [1] Looks infected AND [2] large red area (> 2 inches or 5 cm) or streak   Negative: [1] Fever AND [2] spreading red area or streak   Negative: Suspicious history for the injury   Negative: [1] Looks infected (spreading redness, pus) AND [2] no fever   Negative: No prior tetanus shots (or is not fully vaccinated)     Already assess for any tetanus needs at time of urgent care eval   Negative: [1] HIV positive or severe immunodeficiency (severely weak immune system) AND [2] DIRTY cut   Negative: [1] Last tetanus shot > 5 years ago AND [2] DIRTY cut   Negative: [1] Last tetanus shot > 10 years ago AND [2] CLEAN cut   Negative: [1] After 14 days AND [2] wound isn't healed   Negative: [1] Diabetes mellitus AND [2] minor cut or scratch on foot   Negative: [1] Minor cut or scratch AND [2] from self-injury (e.g., cutting, self-harm) AND [3] stable (i.e., not suicidal, not out of control)    Protocols used: Cuts and [id number]

## 2023-08-20 DIAGNOSIS — J98.01 BRONCHOSPASM: ICD-10-CM

## 2023-08-21 DIAGNOSIS — G25.81 RESTLESS LEGS SYNDROME: ICD-10-CM

## 2023-08-21 RX ORDER — ALBUTEROL SULFATE 90 UG/1
2 AEROSOL, METERED RESPIRATORY (INHALATION) EVERY 4 HOURS PRN
Qty: 8.5 G | Refills: 0 | Status: SHIPPED | OUTPATIENT
Start: 2023-08-21 | End: 2023-10-02

## 2023-08-21 RX ORDER — ROPINIROLE 0.5 MG/1
.5-1 TABLET, FILM COATED ORAL AT BEDTIME
Qty: 60 TABLET | Refills: 0 | Status: SHIPPED | OUTPATIENT
Start: 2023-08-21 | End: 2023-09-14

## 2023-08-21 NOTE — TELEPHONE ENCOUNTER
"Routing refill request to provider for review/approval because:  Patient needs to be seen because it has been more than 1 year since last office visit.    Last Written Prescription Date:  5/10/22  Last Fill Quantity: 100,  # refills: 1   Last office visit provider:  5/10/22     Requested Prescriptions   Pending Prescriptions Disp Refills    rOPINIRole (REQUIP) 0.5 MG tablet [Pharmacy Med Name: ROPINIROLE HCL 0.5 MG TABLET] 60 tablet 3     Sig: TAKE 1-2 TABLETS (0.5-1 MG) BY MOUTH AT BEDTIME FOR RESTLESS LEGS       Antiparkinson's Agents Protocol Failed - 8/21/2023  9:36 AM        Failed - CBC on record in past 12 months     Recent Labs   Lab Test 05/10/22  1635   WBC 7.8   RBC 4.56   HGB 12.4   HCT 39.3                    Failed - ALT on record in past 12 months         Recent Labs   Lab Test 06/15/21  1126   ALT 11             Failed - Serum Creatinine on file in past 12 months     Recent Labs   Lab Test 06/15/21  1126   CR 0.71       Ok to refill medication if creatinine is low          Failed - Recent (6 mo) or future (30 days) visit within the authorizing provider's specialty     Patient had office visit in the last 6 months or has a visit in the next 30 days with authorizing provider or within the authorizing provider's specialty.  See \"Patient Info\" tab in inbasket, or \"Choose Columns\" in Meds & Orders section of the refill encounter.            Passed - Blood pressure under 140/90 in past 12 months     BP Readings from Last 3 Encounters:   08/12/23 134/87   05/10/22 134/89   08/03/21 132/82                 Passed - Medication is active on med list        Passed - Patient is age 18 or older        Passed - No active pregnancy on record        Passed - No positive pregnancy test in the past 12 months             CIRO CUETO RN 08/21/23 1:26 PM  "

## 2023-08-21 NOTE — TELEPHONE ENCOUNTER
"Routing refill request to provider for review/approval because:  Patient needs to be seen because it has been more than 1 year since last office visit.    Last Written Prescription Date:  6/28/22  Last Fill Quantity: 18 g,  # refills: 3   Last office visit provider:   5//10/22    Requested Prescriptions   Pending Prescriptions Disp Refills    albuterol (PROAIR HFA/PROVENTIL HFA/VENTOLIN HFA) 108 (90 Base) MCG/ACT inhaler [Pharmacy Med Name: ALBUTEROL HFA (PROAIR) INHALER]  3     Sig: INHALE 2 PUFFS INTO THE LUNGS EVERY 4 HOURS AS NEEDED FOR SHORTNESS OF BREATH / DYSPNEA OR WHEEZING       Asthma Maintenance Inhalers - Anticholinergics Failed - 8/20/2023  9:38 PM        Failed - Recent (12 mo) or future (30 days) visit within the authorizing provider's specialty     Patient has had an office visit with the authorizing provider or a provider within the authorizing providers department within the previous 12 mos or has a future within next 30 days. See \"Patient Info\" tab in inbasket, or \"Choose Columns\" in Meds & Orders section of the refill encounter.              Passed - Patient is age 12 years or older        Passed - Medication is active on med list       Short-Acting Beta Agonist Inhalers Protocol  Failed - 8/20/2023  9:38 PM        Failed - Recent (12 mo) or future (30 days) visit within the authorizing provider's specialty     Patient has had an office visit with the authorizing provider or a provider within the authorizing providers department within the previous 12 mos or has a future within next 30 days. See \"Patient Info\" tab in inbasket, or \"Choose Columns\" in Meds & Orders section of the refill encounter.              Passed - Patient is age 12 or older        Passed - Medication is active on med list             Afsaneh Darby RN 08/20/23 9:39 PM  "

## 2023-09-11 ENCOUNTER — OFFICE VISIT (OUTPATIENT)
Dept: FAMILY MEDICINE | Facility: CLINIC | Age: 43
End: 2023-09-11
Payer: COMMERCIAL

## 2023-09-11 VITALS
RESPIRATION RATE: 20 BRPM | DIASTOLIC BLOOD PRESSURE: 82 MMHG | BODY MASS INDEX: 46.93 KG/M2 | TEMPERATURE: 97.9 F | WEIGHT: 292 LBS | HEART RATE: 63 BPM | SYSTOLIC BLOOD PRESSURE: 116 MMHG | OXYGEN SATURATION: 99 % | HEIGHT: 66 IN

## 2023-09-11 DIAGNOSIS — R35.0 URINARY FREQUENCY: ICD-10-CM

## 2023-09-11 DIAGNOSIS — F33.0 MILD EPISODE OF RECURRENT MAJOR DEPRESSIVE DISORDER (H): ICD-10-CM

## 2023-09-11 DIAGNOSIS — Z12.4 CERVICAL CANCER SCREENING: ICD-10-CM

## 2023-09-11 DIAGNOSIS — Z00.00 ADULT GENERAL MEDICAL EXAM: Primary | ICD-10-CM

## 2023-09-11 DIAGNOSIS — E66.01 MORBID OBESITY (H): ICD-10-CM

## 2023-09-11 DIAGNOSIS — N89.8 VAGINAL DISCHARGE: ICD-10-CM

## 2023-09-11 DIAGNOSIS — Z23 NEED FOR IMMUNIZATION AGAINST INFLUENZA: ICD-10-CM

## 2023-09-11 DIAGNOSIS — M76.61 RIGHT ACHILLES TENDINITIS: ICD-10-CM

## 2023-09-11 LAB
ALBUMIN SERPL BCG-MCNC: 4.2 G/DL (ref 3.5–5.2)
ALBUMIN UR-MCNC: NEGATIVE MG/DL
ALP SERPL-CCNC: 95 U/L (ref 35–104)
ALT SERPL W P-5'-P-CCNC: 10 U/L (ref 0–50)
ANION GAP SERPL CALCULATED.3IONS-SCNC: 10 MMOL/L (ref 7–15)
APPEARANCE UR: CLEAR
AST SERPL W P-5'-P-CCNC: 20 U/L (ref 0–45)
BACTERIA #/AREA URNS HPF: ABNORMAL /HPF
BILIRUB SERPL-MCNC: 0.2 MG/DL
BILIRUB UR QL STRIP: NEGATIVE
BUN SERPL-MCNC: 7.6 MG/DL (ref 6–20)
CALCIUM SERPL-MCNC: 8.7 MG/DL (ref 8.6–10)
CHLORIDE SERPL-SCNC: 106 MMOL/L (ref 98–107)
CHOLEST SERPL-MCNC: 129 MG/DL
CLUE CELLS: ABNORMAL
COLOR UR AUTO: YELLOW
CREAT SERPL-MCNC: 0.74 MG/DL (ref 0.51–0.95)
DEPRECATED HCO3 PLAS-SCNC: 22 MMOL/L (ref 22–29)
EGFRCR SERPLBLD CKD-EPI 2021: >90 ML/MIN/1.73M2
ERYTHROCYTE [DISTWIDTH] IN BLOOD BY AUTOMATED COUNT: 14.5 % (ref 10–15)
GLUCOSE SERPL-MCNC: 88 MG/DL (ref 70–99)
GLUCOSE UR STRIP-MCNC: NEGATIVE MG/DL
HCT VFR BLD AUTO: 37.5 % (ref 35–47)
HDLC SERPL-MCNC: 36 MG/DL
HGB BLD-MCNC: 11.5 G/DL (ref 11.7–15.7)
HGB UR QL STRIP: NEGATIVE
KETONES UR STRIP-MCNC: NEGATIVE MG/DL
LDLC SERPL CALC-MCNC: 76 MG/DL
LEUKOCYTE ESTERASE UR QL STRIP: ABNORMAL
MCH RBC QN AUTO: 24.9 PG (ref 26.5–33)
MCHC RBC AUTO-ENTMCNC: 30.7 G/DL (ref 31.5–36.5)
MCV RBC AUTO: 81 FL (ref 78–100)
NITRATE UR QL: NEGATIVE
NONHDLC SERPL-MCNC: 93 MG/DL
PH UR STRIP: 7 [PH] (ref 5–8)
PLATELET # BLD AUTO: 297 10E3/UL (ref 150–450)
POTASSIUM SERPL-SCNC: 4.2 MMOL/L (ref 3.4–5.3)
PROT SERPL-MCNC: 7.2 G/DL (ref 6.4–8.3)
RBC # BLD AUTO: 4.61 10E6/UL (ref 3.8–5.2)
RBC #/AREA URNS AUTO: ABNORMAL /HPF
SODIUM SERPL-SCNC: 138 MMOL/L (ref 136–145)
SP GR UR STRIP: 1.01 (ref 1–1.03)
SQUAMOUS #/AREA URNS AUTO: ABNORMAL /LPF
TRICHOMONAS, WET PREP: ABNORMAL
TRIGL SERPL-MCNC: 85 MG/DL
TSH SERPL DL<=0.005 MIU/L-ACNC: 1.64 UIU/ML (ref 0.3–4.2)
UROBILINOGEN UR STRIP-ACNC: 0.2 E.U./DL
WBC # BLD AUTO: 7 10E3/UL (ref 4–11)
WBC #/AREA URNS AUTO: ABNORMAL /HPF
WBC'S/HIGH POWER FIELD, WET PREP: ABNORMAL
YEAST, WET PREP: ABNORMAL

## 2023-09-11 PROCEDURE — 85027 COMPLETE CBC AUTOMATED: CPT | Performed by: FAMILY MEDICINE

## 2023-09-11 PROCEDURE — 84443 ASSAY THYROID STIM HORMONE: CPT | Performed by: FAMILY MEDICINE

## 2023-09-11 PROCEDURE — 80061 LIPID PANEL: CPT | Performed by: FAMILY MEDICINE

## 2023-09-11 PROCEDURE — 87624 HPV HI-RISK TYP POOLED RSLT: CPT | Performed by: FAMILY MEDICINE

## 2023-09-11 PROCEDURE — 99396 PREV VISIT EST AGE 40-64: CPT | Mod: 25 | Performed by: FAMILY MEDICINE

## 2023-09-11 PROCEDURE — 90471 IMMUNIZATION ADMIN: CPT | Performed by: FAMILY MEDICINE

## 2023-09-11 PROCEDURE — 81001 URINALYSIS AUTO W/SCOPE: CPT | Performed by: FAMILY MEDICINE

## 2023-09-11 PROCEDURE — 90686 IIV4 VACC NO PRSV 0.5 ML IM: CPT | Performed by: FAMILY MEDICINE

## 2023-09-11 PROCEDURE — 80053 COMPREHEN METABOLIC PANEL: CPT | Performed by: FAMILY MEDICINE

## 2023-09-11 PROCEDURE — 36415 COLL VENOUS BLD VENIPUNCTURE: CPT | Performed by: FAMILY MEDICINE

## 2023-09-11 PROCEDURE — 87210 SMEAR WET MOUNT SALINE/INK: CPT | Performed by: FAMILY MEDICINE

## 2023-09-11 PROCEDURE — 87086 URINE CULTURE/COLONY COUNT: CPT | Performed by: FAMILY MEDICINE

## 2023-09-11 PROCEDURE — G0145 SCR C/V CYTO,THINLAYER,RESCR: HCPCS | Performed by: FAMILY MEDICINE

## 2023-09-11 ASSESSMENT — ENCOUNTER SYMPTOMS
ARTHRALGIAS: 1
SORE THROAT: 0
HEMATURIA: 0
MYALGIAS: 1
HEARTBURN: 0
HEMATOCHEZIA: 0
DIARRHEA: 0
WEAKNESS: 0
NAUSEA: 0
COUGH: 0
ABDOMINAL PAIN: 0
DYSURIA: 0
FREQUENCY: 1
JOINT SWELLING: 0
FEVER: 0
HEADACHES: 1
PARESTHESIAS: 0
NERVOUS/ANXIOUS: 0
DIZZINESS: 0
PALPITATIONS: 0
CHILLS: 0
EYE PAIN: 0
CONSTIPATION: 0
SHORTNESS OF BREATH: 0

## 2023-09-11 ASSESSMENT — PATIENT HEALTH QUESTIONNAIRE - PHQ9
10. IF YOU CHECKED OFF ANY PROBLEMS, HOW DIFFICULT HAVE THESE PROBLEMS MADE IT FOR YOU TO DO YOUR WORK, TAKE CARE OF THINGS AT HOME, OR GET ALONG WITH OTHER PEOPLE: EXTREMELY DIFFICULT
SUM OF ALL RESPONSES TO PHQ QUESTIONS 1-9: 11
SUM OF ALL RESPONSES TO PHQ QUESTIONS 1-9: 11

## 2023-09-11 NOTE — PROGRESS NOTES
"   SUBJECTIVE:   CC: Sejal is an 43 year old who presents for preventive health visit.       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  Feeling like everything is a struggle  Gets shaky  Working at home - wants to scream into a pillow  Tried to talk to her boss -   Has 2 weeks of vacation but can't take them because everyone else has time off   Had increase in depression meds in last months -     Mom was her \"therapist\"  Does have EAP program at work - gets up to 8 free sessions/year  Did some coping mechanism through [facility] (they took care of her mom)      Will call EAP today - to follow up  Does some \"exercise\" - walks the dog  Practicing some anxiety coping mechanisms for her workplace -   Looking for another job - feels like needs a change of environment     Wants to make sure environment is taken care of     Right \"ankle\" symptoms  Pain in right Achilles tendon and surrounding - with walking  Hurts to drive - and up her leg   Not getting better x  months      Healthy Habits:     Getting at least 3 servings of Calcium per day:  Yes    Bi-annual eye exam:  NO    Dental care twice a year:  Yes    Sleep apnea or symptoms of sleep apnea:  Daytime drowsiness    Diet:  Low salt    Frequency of exercise:  4-5 days/week    Duration of exercise:  15-30 minutes    Taking medications regularly:  Yes    Medication side effects:  None    Additional concerns today:  Yes      Today's PHQ-9 Score:       9/11/2023     9:25 AM   PHQ-9 SCORE   PHQ-9 Total Score MyChart 11 (Moderate depression)   PHQ-9 Total Score 11       Have you ever done Advance Care Planning? (For example, a Health Directive, POLST, or a discussion with a medical provider or your loved ones about your wishes): no written documents     Social History     Tobacco Use    Smoking status: Every Day     Packs/day: 1.00     Years: 15.00     Pack years: 15.00     Types: " Cigarettes    Smokeless tobacco: Never    Tobacco comments:     half to almost a pack a day   Substance Use Topics    Alcohol use: Never             9/11/2023     9:27 AM   Alcohol Use   Prescreen: >3 drinks/day or >7 drinks/week? No     Reviewed orders with patient.  Reviewed health maintenance and updated orders accordingly - Yes  BP Readings from Last 3 Encounters:   09/11/23 116/82   08/12/23 134/87   05/10/22 134/89    Wt Readings from Last 3 Encounters:   09/11/23 132.5 kg (292 lb)   08/12/23 131.1 kg (289 lb)   05/10/22 133.4 kg (294 lb)                  Patient Active Problem List   Diagnosis    Female infertility associated with anovulation    GA (granuloma annulare)    Herpes simplex type 2 infection    Tobacco use disorder    Morbid obesity (H)    Trouble in sleeping    Itchy skin - right breast    Mirena IUD (expires 10/2018)    Contraception management    Mild episode of recurrent major depressive disorder (H)     Past Surgical History:   Procedure Laterality Date    CHOLECYSTECTOMY      [place] LAP,TUBAL CAUTERY Bilateral 10/10/2019    Procedure: LAPAROSCOPIC BILATERAL SALPINGECTOMY, LEFT OVARIAN CYSTECTOMY;  Surgeon: Angela Means MD;  Location: [facility];  Service: Gynecology       Social History     Tobacco Use    Smoking status: Every Day     Packs/day: 1.00     Years: 15.00     Pack years: 15.00     Types: Cigarettes    Smokeless tobacco: Never    Tobacco comments:     half to almost a pack a day   Substance Use Topics    Alcohol use: Never     Family History   Problem Relation Age of Onset    Heart Disease Mother     Prostate Cancer Father     Hypertension Father     Breast Cancer Cousin          Current Outpatient Medications   Medication Sig Dispense Refill    acetaminophen (TYLENOL) 500 MG tablet Take 500-1,000 mg by mouth every 6 hours as needed for mild pain      albuterol (PROAIR HFA/PROVENTIL HFA/VENTOLIN HFA) 108 (90 Base) MCG/ACT inhaler INHALE 2 PUFFS INTO THE LUNGS EVERY 4  HOURS AS NEEDED FOR SHORTNESS OF BREATH / DYSPNEA OR WHEEZING 8.5 g 0    FLUoxetine (PROZAC) 40 MG capsule TAKE 1 CAPSULE BY MOUTH EVERY DAY 90 capsule 0    rOPINIRole (REQUIP) 0.5 MG tablet TAKE 1-2 TABLETS BY MOUTH AT BEDTIME FOR RESTLESS LEGS 60 tablet 0     Allergies   Allergen Reactions    [place] Oil Other (See Comments)     Tongue tingling    Black [place] [Black [place] Flavor] Hives     Other nuts do not bother patient    Penicillins Hives       Breast Cancer Screening:    FHS-7:       9/11/2023     9:28 AM   Breast CA Risk Assessment (FHS-7)   Did any of your first-degree relatives have breast or ovarian cancer? No   Did any of your relatives have bilateral breast cancer? No   Did any man in your family have breast cancer? No   Did any woman in your family have breast and ovarian cancer? Yes   Did any woman in your family have breast cancer before age 50 y? Yes   Do you have 2 or more relatives with breast and/or ovarian cancer? No   Do you have 2 or more relatives with breast and/or bowel cancer? No       Mammogram Screening - Offered annual screening and updated Health Maintenance for [place] plan based on risk factor consideration  Pertinent mammograms are reviewed under the imaging tab.    History of abnormal Pap smear: NO - age 30-65 PAP every 5 years with negative HPV co-testing recommended      Latest Ref Rng & Units 2/19/2018    12:09 PM   PAP / HPV   PAP  Negative for squamous intraepithelial lesion or malignancy  Electronically signed by Nelly Liang CT (ASCP) on 2/22/2018 at  1:52 PM      HPV 16 DNA NEG Negative    HPV 18 DNA NEG Negative    Other HR HPV NEG Negative      Reviewed and updated as needed this visit by clinical staff   Tobacco  Allergies  Meds              Reviewed and updated as needed this visit by Provider                 Past Medical History:   Diagnosis Date    Anovulation     History of anesthesia complications     Some gasping after gallbaldder surgery    Obesity      "Tobacco abuse       Past Surgical History:   Procedure Laterality Date    CHOLECYSTECTOMY      [place] LAP,TUBAL CAUTERY Bilateral 10/10/2019    Procedure: LAPAROSCOPIC BILATERAL SALPINGECTOMY, LEFT OVARIAN CYSTECTOMY;  Surgeon: Angela Means MD;  Location: [facility];  Service: Gynecology     OB History    Para Term  AB Living   1 1 1 0 0 0   SAB IAB Ectopic Multiple Live Births   0 0 0 0 0      # Outcome Date GA Lbr Anjel/2nd Weight Sex Delivery Anes PTL Lv   1 Term                Review of Systems   Constitutional:  Negative for chills and fever.   HENT:  Negative for congestion, ear pain, hearing loss and sore throat.    Eyes:  Negative for pain and visual disturbance.   Respiratory:  Negative for cough and shortness of breath.    Cardiovascular:  Negative for chest pain, palpitations and peripheral edema.   Gastrointestinal:  Negative for abdominal pain, constipation, diarrhea, heartburn, hematochezia and nausea.   Genitourinary:  Positive for frequency. Negative for dysuria, genital sores, hematuria and urgency.   Musculoskeletal:  Positive for arthralgias and myalgias. Negative for joint swelling.   Skin:  Negative for rash.   Neurological:  Positive for headaches. Negative for dizziness, weakness and paresthesias.   Psychiatric/Behavioral:  Positive for mood changes. The patient is not nervous/anxious.           OBJECTIVE:   /82 (BP Location: Left arm, Patient Position: Sitting, Cuff Size: Adult Large)   Pulse 63   Temp 97.9  F (36.6  C) (Temporal)   Resp 20   Ht 1.676 m (5' 6\")   Wt 132.5 kg (292 lb)   LMP 2023 (Approximate)   SpO2 99%   BMI 47.13 kg/m    Physical Exam  Vitals reviewed.   Constitutional:       General: She is not in acute distress.     Appearance: Normal appearance. She is obese.   HENT:      Head: Normocephalic.      Right Ear: Tympanic membrane, ear canal and external ear normal.      Left Ear: Tympanic membrane, ear canal and external ear normal. "      Nose: Nose normal.      Mouth/Throat:      Mouth: Mucous membranes are moist.      Pharynx: No posterior oropharyngeal erythema.   Eyes:      Extraocular Movements: Extraocular movements intact.      Conjunctiva/sclera: Conjunctivae normal.      Pupils: Pupils are equal, round, and reactive to light.   Cardiovascular:      Rate and Rhythm: Normal rate and regular rhythm.      Pulses: Normal pulses.      Heart sounds: Normal heart sounds. No murmur heard.  Pulmonary:      Effort: Pulmonary effort is normal.      Breath sounds: Normal breath sounds.   Abdominal:      Palpations: Abdomen is soft. There is no mass.      Tenderness: There is no abdominal tenderness. There is no guarding or rebound.   Genitourinary:     Comments: PELVIC EXAM:External genitalia: normal  Vaginal mucosa normal  Vaginal discharge: none  Speculum exam shows a normal appearing cervix .   Bimanual exam: Cervix closed, firm, non tender  to motion.      Musculoskeletal:         General: No deformity. Normal range of motion.      Cervical back: Normal range of motion and neck supple.      Right lower leg: No edema.      Left lower leg: No edema.   Lymphadenopathy:      Cervical: No cervical adenopathy.   Skin:     General: Skin is warm and dry.   Neurological:      General: No focal deficit present.      Mental Status: She is alert.   Psychiatric:         Mood and Affect: Mood normal.         Behavior: Behavior normal.           Diagnostic Test Results:  Labs reviewed in Epic  Results for orders placed or performed in visit on 09/11/23   TSH with free T4 reflex     Status: Normal   Result Value Ref Range    TSH 1.64 0.30 - 4.20 uIU/mL   Comprehensive metabolic panel (BMP + Alb, Alk Phos, ALT, AST, Total. Bili, TP)     Status: Normal   Result Value Ref Range    Sodium 138 136 - 145 mmol/L    Potassium 4.2 3.4 - 5.3 mmol/L    Chloride 106 98 - 107 mmol/L    Carbon Dioxide (CO2) 22 22 - 29 mmol/L    Anion Gap 10 7 - 15 mmol/L    Urea Nitrogen 7.6  6.0 - 20.0 mg/dL    Creatinine 0.74 0.51 - 0.95 mg/dL    Calcium 8.7 8.6 - 10.0 mg/dL    Glucose 88 70 - 99 mg/dL    Alkaline Phosphatase 95 35 - 104 U/L    AST 20 0 - 45 U/L    ALT 10 0 - 50 U/L    Protein Total 7.2 6.4 - 8.3 g/dL    Albumin 4.2 3.5 - 5.2 g/dL    Bilirubin Total 0.2 <=1.2 mg/dL    GFR Estimate >90 >60 mL/min/1.73m2   Lipid Profile (Chol, Trig, HDL, LDL calc)     Status: Abnormal   Result Value Ref Range    Cholesterol 129 <200 mg/dL    Triglycerides 85 <150 mg/dL    Direct Measure HDL 36 (L) >=50 mg/dL    LDL Cholesterol Calculated 76 <=100 mg/dL    Non HDL Cholesterol 93 <130 mg/dL    Narrative    Cholesterol  Desirable:  <200 mg/dL    Triglycerides  Normal:  Less than 150 mg/dL  Borderline High:  150-199 mg/dL  High:  200-499 mg/dL  Very High:  Greater than or equal to 500 mg/dL    Direct Measure HDL  Female:  Greater than or equal to 50 mg/dL   Male:  Greater than or equal to 40 mg/dL    LDL Cholesterol  Desirable:  <100mg/dL  Above Desirable:  100-129 mg/dL   Borderline High:  130-159 mg/dL   High:  160-189 mg/dL   Very High:  >= 190 mg/dL    Non HDL Cholesterol  Desirable:  130 mg/dL  Above Desirable:  130-159 mg/dL  Borderline High:  160-189 mg/dL  High:  190-219 mg/dL  Very High:  Greater than or equal to 220 mg/dL   CBC with platelets     Status: Abnormal   Result Value Ref Range    WBC Count 7.0 4.0 - 11.0 10e3/uL    RBC Count 4.61 3.80 - 5.20 10e6/uL    Hemoglobin 11.5 (L) 11.7 - 15.7 g/dL    Hematocrit 37.5 35.0 - 47.0 %    MCV 81 78 - 100 fL    MCH 24.9 (L) 26.5 - 33.0 pg    MCHC 30.7 (L) 31.5 - 36.5 g/dL    RDW 14.5 10.0 - 15.0 %    Platelet Count 297 150 - 450 10e3/uL   UA with Microscopic - lab collect     Status: Abnormal   Result Value Ref Range    Color Urine Yellow Colorless, Straw, Light Yellow, Yellow    Appearance Urine Clear Clear    Glucose Urine Negative Negative mg/dL    Bilirubin Urine Negative Negative    Ketones Urine Negative Negative mg/dL    Specific Gravity Urine  1.010 1.005 - 1.030    Blood Urine Negative Negative    pH Urine 7.0 5.0 - 8.0    Protein Albumin Urine Negative Negative mg/dL    Urobilinogen Urine 0.2 0.2, 1.0 E.U./dL    Nitrite Urine Negative Negative    Leukocyte Esterase Urine Trace (A) Negative   Urine Microscopic Exam     Status: Abnormal   Result Value Ref Range    Bacteria Urine Few (A) None Seen /HPF    RBC Urine 0-2 0-2 /HPF /HPF    WBC Urine 0-5 0-5 /HPF /HPF    Squamous Epithelials Urine Few (A) None Seen /LPF   HPV High Risk Types DNA Cervical     Status: None   Result Value Ref Range    Other HR HPV Negative Negative    HPV16 DNA Negative Negative    HPV18 DNA Negative Negative    FINAL DIAGNOSIS       This patient's sample is negative for HPV DNA.        This test was developed and its performance characteristics determined by the [facility]. It has not been cleared or approved by the FDA. The laboratory is regulated under CLIA as qualified to perform high-complexity testing. This test is used for clinical purposes. It should not be regarded as investigational or for research.    METHODOLOGY: The Roche Tanisha 4800 system uses automated extraction, simultaneous amplification of HPV (L1 region) and beta-globin, followed by real time detection of fluorescent labeled HPV and beta globin using specific oligonucleotide probes. The test specifically identifies types HPV 16 DNA and HPV 18 DNA while concurrently detecting the rest of the high risk types (31, 33, 35, 39, 45, 51, 52, 56, 58, 59, 66 or 68).    COMMENTS: This test is not intended for use as a screening device for woman under age 30 with normal cervical cytology. Results should be correlated with cytologic and histologic findings. Close clinical followup is recommended.       Pap Screen with HPV - recommended age 30 - 65 years     Status: None   Result Value Ref Range    Interpretation        Negative for Intraepithelial Lesion or  Malignancy (NILM)    Comment         Papanicolaou Test Limitations:  Cervical cytology is a screening test with limited sensitivity, and regular screening is critical for cancer prevention.  Pap tests are primarily effective for the diagnosis/prevention of squamous cell carcinoma, not adenocarcinoma or other cancers.        Specimen Adequacy       Satisfactory for evaluation, endocervical/transformation zone component absent    Clinical Information       none      Reflex Testing Yes regardless of result     Previous Abnormal?       No      Performing Labs       The technical component of this testing was completed at [facility]     Wet prep - Clinic Collect     Status: Abnormal    Specimen: Vagina; Swab   Result Value Ref Range    Trichomonas Absent Absent    Yeast Absent Absent    Clue Cells Absent Absent    WBCs/high power field 3+ (A) None   Urine Culture Aerobic Bacterial - lab collect     Status: None    Specimen: Urine, Midstream   Result Value Ref Range    Culture <10,000 CFU/mL Mixture of urogenital belén        ASSESSMENT/PLAN:   1. Adult general medical exam  This is a 44 yo female - here for physical exam   - TSH with free T4 reflex; Future  - Comprehensive metabolic panel (BMP + Alb, Alk Phos, ALT, AST, Total. Bili, TP); Future  - Lipid Profile (Chol, Trig, HDL, LDL calc); Future  - CBC with platelets; Future  - TSH with free T4 reflex  - Comprehensive metabolic panel (BMP + Alb, Alk Phos, ALT, AST, Total. Bili, TP)  - Lipid Profile (Chol, Trig, HDL, LDL calc)  - CBC with platelets    2. Right Achilles tendinitis  Patient complains of right Achilles pain - makes walking/exercise more painful, less likely to happen - will refer to PT   - Physical Therapy Referral; Future    3. Cervical cancer screening  Due fro cervix cancer screening - ordered   - Pap Screen with HPV - recommended age 30 - 65 years  - HPV Hold (Lab Only)  - HPV High Risk Types  "DNA Cervical    4. Mild episode of recurrent major depressive disorder (H)  H/o mild depression      6/15/2021    10:00 AM 5/10/2022     3:01 PM 9/11/2023     9:25 AM   PHQ   PHQ-9 Total Score 18 14 11   Q9: Thoughts of better off dead/self-harm past 2 weeks Not at all Not at all Not at all     Improving scores - continues to struggle after mom's death (\"mom was my therapist\") discussed getting a real therapist - she is reaching out to her EAP at work - will let me know if she needs a referral in the system     5. Morbid obesity (H)  Body mass index is 47.13 kg/m .  Discussed exercise/diet     6. Need for immunization against influenza  Due for influenza vaccine - ordered   - INFLUENZA VACCINE IM > 6 MONTHS VALENT IIV4 (AFLURIA/FLUZONE)    7. Vaginal discharge  Vaginal discharge - check wet prep   - Wet prep - Clinic Collect    8. Urinary frequency  Patient with urinary frequency - check UA/UC - treat if culture positive -   - UA with Microscopic - lab collect; Future  - Urine Culture Aerobic Bacterial - lab collect; Future  - UA with Microscopic - lab collect  - Urine Culture Aerobic Bacterial - lab collect  - Urine Microscopic Exam          Depression Screening Follow Up        9/11/2023     9:25 AM   PHQ   PHQ-9 Total Score 11   Q9: Thoughts of better off dead/self-harm past 2 weeks Not at all         9/11/2023     9:25 AM   Last PHQ-9   1.  Little interest or pleasure in doing things 2   2.  Feeling down, depressed, or hopeless 2   3.  Trouble falling or staying asleep, or sleeping too much 1   4.  Feeling tired or having little energy 2   5.  Poor appetite or overeating 1   6.  Feeling bad about yourself 1   7.  Trouble concentrating 1   8.  Moving slowly or restless 1   Q9: Thoughts of better off dead/self-harm past 2 weeks 0   PHQ-9 Total Score 11       Follow Up Actions Taken  Crisis resource information provided in After Visit Summary  Referred patient back to current mental health provider. "       COUNSELING:  Reviewed preventive health counseling, as reflected in patient instructions       Regular exercise       Healthy diet/nutrition        She reports that she has been smoking cigarettes. She has a 15.00 pack-year smoking history. She has never used smokeless tobacco.  Nicotine/Tobacco Cessation Plan:   Information offered: Patient not interested at this time          LARRY CASTILLO MD  [facility]    Prior to immunization administration, verified patients identity using patient s name and date of birth. Please see Immunization Activity for additional information.     Screening Questionnaire for Adult Immunization    Are you sick today?   No   Do you have allergies to medications, food, a vaccine component or latex?   Yes   Have you ever had a serious reaction after receiving a vaccination?   No   Do you have a long-term health problem with heart, lung, kidney, or metabolic disease (e.g., diabetes), asthma, a blood disorder, no spleen, complement component deficiency, a cochlear implant, or a spinal fluid leak?  Are you on long-term aspirin therapy?   No   Do you have cancer, leukemia, HIV/AIDS, or any other immune system problem?   No   Do you have a parent, brother, or sister with an immune system problem?   No   In the past 3 months, have you taken medications that affect  your immune system, such as prednisone, other steroids, or anticancer drugs; drugs for the treatment of rheumatoid arthritis, Crohn s disease, or psoriasis; or have you had radiation treatments?   No   Have you had a seizure, or a brain or other nervous system problem?   No   During the past year, have you received a transfusion of blood or blood    products, or been given immune (gamma) globulin or antiviral drug?   No   For women: Are you pregnant or is there a chance you could become       pregnant during the next month?   No   Have you received any vaccinations in the past 4 weeks?    No     Immunization questionnaire was positive for at least one answer.  Notified Dr. Garcia.      Patient instructed to remain in clinic for 15 minutes afterwards, and to report any adverse reactions.     Screening performed by Briseida Gomez CMA on 9/11/2023 at 9:42 AM.      Answers submitted by the patient for this visit:  Patient Health Questionnaire (Submitted on 9/11/2023)  If you checked off any problems, how difficult have these problems made it for you to do your work, take care of things at home, or get along with other people?: Extremely difficult  PHQ9 TOTAL SCORE: 11

## 2023-09-13 LAB
BACTERIA UR CULT: NORMAL
BKR LAB AP GYN ADEQUACY: NORMAL
BKR LAB AP GYN INTERPRETATION: NORMAL
BKR LAB AP HPV REFLEX: NORMAL
BKR LAB AP PREVIOUS ABNORMAL: NORMAL
PATH REPORT.COMMENTS IMP SPEC: NORMAL
PATH REPORT.COMMENTS IMP SPEC: NORMAL
PATH REPORT.RELEVANT HX SPEC: NORMAL

## 2023-09-14 DIAGNOSIS — G25.81 RESTLESS LEGS SYNDROME: ICD-10-CM

## 2023-09-14 LAB
HUMAN PAPILLOMA VIRUS 16 DNA: NEGATIVE
HUMAN PAPILLOMA VIRUS 18 DNA: NEGATIVE
HUMAN PAPILLOMA VIRUS FINAL DIAGNOSIS: NORMAL
HUMAN PAPILLOMA VIRUS OTHER HR: NEGATIVE

## 2023-09-14 RX ORDER — ROPINIROLE 0.5 MG/1
TABLET, FILM COATED ORAL
Qty: 60 TABLET | Refills: 0 | Status: SHIPPED | OUTPATIENT
Start: 2023-09-14 | End: 2023-10-12

## 2023-09-18 ENCOUNTER — THERAPY VISIT (OUTPATIENT)
Dept: PHYSICAL THERAPY | Facility: REHABILITATION | Age: 43
End: 2023-09-18
Attending: FAMILY MEDICINE
Payer: COMMERCIAL

## 2023-09-18 DIAGNOSIS — M76.61 RIGHT ACHILLES TENDINITIS: ICD-10-CM

## 2023-09-18 DIAGNOSIS — M25.671 DECREASED RANGE OF MOTION OF RIGHT ANKLE: Primary | ICD-10-CM

## 2023-09-18 PROCEDURE — 97140 MANUAL THERAPY 1/> REGIONS: CPT | Mod: GP | Performed by: PHYSICAL THERAPIST

## 2023-09-18 PROCEDURE — 97110 THERAPEUTIC EXERCISES: CPT | Mod: GP | Performed by: PHYSICAL THERAPIST

## 2023-09-18 PROCEDURE — 97161 PT EVAL LOW COMPLEX 20 MIN: CPT | Mod: GP | Performed by: PHYSICAL THERAPIST

## 2023-09-18 NOTE — PROGRESS NOTES
PHYSICAL THERAPY EVALUATION  Type of Visit: Evaluation    See electronic medical record for Abuse and Falls Screening details.    Subjective   Pt is a 43 year-old woman with chief complaint right ankle pain. No injury onset. Pt feels pain when walking and pulling when sitting. When driving she will have pain and spasming up the leg. Her pain is in the R achilles. She wore a compression anklet for awhile. At home she wears flip flops a lot. Awhile back she was told she had plantar fasciitis and she was working on the stretches for this and it didn't help.       Presenting condition or subjective complaint: Right ankle, achilles tendonitis  Date of onset: 04/01/23    Relevant medical history: Depression; Overweight; Pain at night or rest; Smoking   Dates & types of surgery: gall bladder, tubal removal    Prior diagnostic imaging/testing results:       Prior therapy history for the same diagnosis, illness or injury: No      Prior Level of Function  Transfers: Independent  Ambulation: Independent  ADL: Independent      Living Environment  Social support: With family members   Type of home:     Stairs to enter the home:         Ramp:     Stairs inside the home:         Help at home: None  Equipment owned:       Employment: Yes   Hobbies/Interests: Family time, [employer], [employer], [employer]    Patient goals for therapy: less pain    Pain assessment: Pain present  Location: Right ankle - medial/lateral achilles tendon /Rating: with movement: 9/10, at rest: 3/10     Objective   FOOT/ANKLE EVALUATION    INTEGUMENTARY (edema, incisions): WNL  POSTURE: B genu valgus and mild B pes planus   GAIT:   Weightbearing Status: WBAT  Assistive Device(s): None  Gait Deviations: Mild antalgia right stance  BALANCE/PROPRIOCEPTION: 10+ sec SLS each side with increased trendelenburg on right side   WEIGHT BEARING ALIGNMENT:   NON-WEIGHTBEARING ALIGNMENT:    ROM:   (Degrees) Left AROM Left PROM  Right AROM Right PROM   Ankle  Dorsiflexion 2 deg with knee extended (0-4 deg with knee flexed)  Lacking 4 deg to neutral with knee extended (to 0 deg with knee flexed)    Ankle Plantarflexion 70  47    Ankle Inversion       Ankle Eversion       Great Toe Flexion       Great Toe Extension             PALPATION: tender right achilles tendon distally and medial/lateral at calcaneus  JOINT MOBILITY: right ankle WNL    Assessment & Plan   CLINICAL IMPRESSIONS  Medical Diagnosis: Right Achilles tendinitis    Treatment Diagnosis: Decreased ROM of R ankle   Impression/Assessment: Pt is a 43 year-old woman with chief complaint right ankle pain. She demonstrates symptoms consistent with R achilles tendonitis. She demonstrates decreased R ankle PF strength and decreased B (R>L) gastroc and soleus lengths with tenderness at R achilles tendon with palpation. She has most pain when walking and it gets up to 9/10 at this time. She is appropriate for skilled PT to address impairments, instruct in HEP to reduce pain and improve functional mobility.     Clinical Decision Making (Complexity):  Clinical Presentation: Stable/Uncomplicated  Clinical Presentation Rationale: based on medical and personal factors listed in PT evaluation  Clinical Decision Making (Complexity): Low complexity    PLAN OF CARE  Treatment Interventions:  Interventions: Gait Training, Manual Therapy, Neuromuscular Re-education, Therapeutic Activity, Therapeutic Exercise, Self-Care/Home Management    Long Term Goals     PT Goal 1  Goal Identifier: Pain  Goal Description: Pt will report improved R ankle pain from 9/10 to <3/10 pain with walking.  Target Date: 11/27/23      Frequency of Treatment: 1x/month  Duration of Treatment: 2-3 visits         Risks and benefits of evaluation/treatment have been explained.   Patient/Family/caregiver agrees with Plan of Care.     Evaluation Time:     PT Eval, Low Complexity Minutes (69461): 15       Signing Clinician: Rakesh Jones PT

## 2023-09-21 ENCOUNTER — MYC MEDICAL ADVICE (OUTPATIENT)
Dept: FAMILY MEDICINE | Facility: CLINIC | Age: 43
End: 2023-09-21
Payer: COMMERCIAL

## 2023-09-27 NOTE — TELEPHONE ENCOUNTER
Pt called back stating that Gi wants her to be seen this week by her PCP regarding this paperwork. Not sure if pt do or not, so please call and advise pt on that. Thanks!

## 2023-09-29 ENCOUNTER — OFFICE VISIT (OUTPATIENT)
Dept: FAMILY MEDICINE | Facility: CLINIC | Age: 43
End: 2023-09-29
Payer: COMMERCIAL

## 2023-09-29 VITALS
RESPIRATION RATE: 18 BRPM | SYSTOLIC BLOOD PRESSURE: 115 MMHG | DIASTOLIC BLOOD PRESSURE: 78 MMHG | HEIGHT: 65 IN | OXYGEN SATURATION: 96 % | HEART RATE: 69 BPM | BODY MASS INDEX: 47.98 KG/M2 | WEIGHT: 288 LBS | TEMPERATURE: 97.4 F

## 2023-09-29 DIAGNOSIS — F43.21 GRIEF: ICD-10-CM

## 2023-09-29 DIAGNOSIS — F33.2 SEVERE EPISODE OF RECURRENT MAJOR DEPRESSIVE DISORDER, WITHOUT PSYCHOTIC FEATURES (H): Primary | ICD-10-CM

## 2023-09-29 DIAGNOSIS — F41.9 ANXIETY: ICD-10-CM

## 2023-09-29 DIAGNOSIS — Z02.89 ENCOUNTER FOR COMPLETION OF FORM WITH PATIENT: ICD-10-CM

## 2023-09-29 PROCEDURE — 99214 OFFICE O/P EST MOD 30 MIN: CPT | Performed by: FAMILY MEDICINE

## 2023-09-29 RX ORDER — HYDROXYZINE PAMOATE 50 MG/1
50 CAPSULE ORAL 3 TIMES DAILY PRN
Qty: 60 CAPSULE | Refills: 3 | Status: SHIPPED | OUTPATIENT
Start: 2023-09-29

## 2023-09-29 ASSESSMENT — ENCOUNTER SYMPTOMS
HEADACHES: 0
DIARRHEA: 0
FREQUENCY: 0
SHORTNESS OF BREATH: 0
MYALGIAS: 0
FEVER: 0
PARESTHESIAS: 0
SORE THROAT: 0
DIZZINESS: 0
SLEEP DISTURBANCE: 1
COUGH: 0
ABDOMINAL PAIN: 0
WEAKNESS: 0
NERVOUS/ANXIOUS: 1
ALLERGIC/IMMUNOLOGIC NEGATIVE: 1
CONSTIPATION: 0
CHILLS: 0
EYE PAIN: 0
DYSURIA: 0
DECREASED CONCENTRATION: 1
ARTHRALGIAS: 0
BRUISES/BLEEDS EASILY: 0
PALPITATIONS: 0

## 2023-09-29 NOTE — PROGRESS NOTES
"1. Severe episode of recurrent major depressive disorder, without psychotic features (H)  2. Grief  3. Anxiety  This is a 42 yo female with previously mild depression, now severe in nature - finding difficulty accomplishing tasks at work; difficulty organizing thoughts/tasks.  This is compounded by the relatively recent death of her mother, the mental health struggles of her child and now increasing anxiety.  She has worked at the same company for 18 years.  Is currently finding it very difficult to manage all that is being asked of her.  She has been perfectly competent until recently, when she began struggling.  She is very anxious about all of this.  She feels like she \"needs to get away\" from her job and try to find time for herself.  Willt ry Hydroxyzine 50 mg TID prn - anxiety.  Continue her Fluoxetine at 40 mg daily as well.    - hydrOXYzine (VISTARIL) 50 MG capsule; Take 1 capsule (50 mg) by mouth 3 times daily as needed for anxiety  Dispense: 60 capsule; Refill: 3    4. Encounter for completion of form with patient  Majority of visit was spent completing [employer] disability forms - this is related to #1, #2, #3.  She is really struggling to maintain her job right now - trouble focusing, trouble concentrating, trouble managing the day to day demands of the job.  She'd like to keep her job, but feels she needs to get away for a brief period of time to regroup and restart.  Have discussed this at great length.  See copies of her disability forms (provided from [employer]).  These forms were completed and sent to [employer].  Copy was also provided to patient.            Rashid Post is a 43 year old, presenting for the following health issues:  Follow Up (Follow up on paperwork )        9/29/2023     8:03 AM   Additional Questions   Roomed by [facility]   Accompanied by self       Going to physical therapy - once - will do more exercise at home (for Achilles)  Going to therapy - starting tonight - through [facility]  - " "8 free sessions -     Leave starts this afternoon (working 1/2 day today)  Plans to be off until seeing me back       History of Present Illness       Reason for visit:  Follow up    She eats 0-1 servings of fruits and vegetables daily.She consumes 3 sweetened beverage(s) daily.She exercises with enough effort to increase her heart rate 20 to 29 minutes per day.  She exercises with enough effort to increase her heart rate 3 or less days per week.          Review of Systems   Constitutional:  Negative for chills and fever.   HENT:  Negative for congestion, ear pain and sore throat.    Eyes:  Negative for pain and visual disturbance.   Respiratory:  Negative for cough and shortness of breath.    Cardiovascular:  Negative for chest pain, palpitations and peripheral edema.   Gastrointestinal:  Negative for abdominal pain, constipation and diarrhea.   Endocrine: Negative for polyuria.   Genitourinary:  Negative for dysuria, frequency and vaginal discharge.   Musculoskeletal:  Negative for arthralgias and myalgias.   Skin:  Negative for rash.   Allergic/Immunologic: Negative.    Neurological:  Negative for dizziness, weakness, headaches and paresthesias.   Hematological:  Does not bruise/bleed easily.   Psychiatric/Behavioral:  Positive for decreased concentration, mood changes and sleep disturbance. The patient is nervous/anxious.             Objective    /78 (BP Location: Right arm, Patient Position: Sitting, Cuff Size: Adult Large)   Pulse 69   Temp 97.4  F (36.3  C) (Temporal)   Resp 18   Ht 1.651 m (5' 5\")   Wt 130.6 kg (288 lb)   LMP 09/18/2023 (Approximate)   SpO2 96%   BMI 47.93 kg/m    Body mass index is 47.93 kg/m .  Physical Exam  Vitals reviewed.   Constitutional:       General: She is not in acute distress.     Appearance: Normal appearance.   Neurological:      General: No focal deficit present.      Mental Status: She is alert and oriented to person, place, and time.   Psychiatric:      " Comments: Mood low - anxious in general              No results found for this or any previous visit (from the past 24 hour(s)).          Prior to immunization administration, verified patients identity using patient s name and date of birth. Please see Immunization Activity for additional information.     Screening Questionnaire for Adult Immunization    Are you sick today?   No   Do you have allergies to medications, food, a vaccine component or latex?   Yes   Have you ever had a serious reaction after receiving a vaccination?   No   Do you have a long-term health problem with heart, lung, kidney, or metabolic disease (e.g., diabetes), asthma, a blood disorder, no spleen, complement component deficiency, a cochlear implant, or a spinal fluid leak?  Are you on long-term aspirin therapy?   No   Do you have cancer, leukemia, HIV/AIDS, or any other immune system problem?   No   Do you have a parent, brother, or sister with an immune system problem?   No   In the past 3 months, have you taken medications that affect  your immune system, such as prednisone, other steroids, or anticancer drugs; drugs for the treatment of rheumatoid arthritis, Crohn s disease, or psoriasis; or have you had radiation treatments?   No   Have you had a seizure, or a brain or other nervous system problem?   No   During the past year, have you received a transfusion of blood or blood    products, or been given immune (gamma) globulin or antiviral drug?   No   For women: Are you pregnant or is there a chance you could become       pregnant during the next month?   No   Have you received any vaccinations in the past 4 weeks?   Yes     Immunization questionnaire was positive for at least one answer.  Notified provider.      Patient instructed to remain in clinic for 15 minutes afterwards, and to report any adverse reactions.     Screening performed by Jordan Martino MA on 9/29/2023 at 8:06 AM.

## 2023-10-02 ENCOUNTER — TELEPHONE (OUTPATIENT)
Dept: FAMILY MEDICINE | Facility: CLINIC | Age: 43
End: 2023-10-02

## 2023-10-02 ENCOUNTER — OFFICE VISIT (OUTPATIENT)
Dept: FAMILY MEDICINE | Facility: CLINIC | Age: 43
End: 2023-10-02
Payer: COMMERCIAL

## 2023-10-02 VITALS
WEIGHT: 287 LBS | BODY MASS INDEX: 47.82 KG/M2 | HEIGHT: 65 IN | SYSTOLIC BLOOD PRESSURE: 138 MMHG | HEART RATE: 72 BPM | RESPIRATION RATE: 20 BRPM | OXYGEN SATURATION: 97 % | DIASTOLIC BLOOD PRESSURE: 80 MMHG | TEMPERATURE: 98 F

## 2023-10-02 DIAGNOSIS — B96.89 ACUTE BACTERIAL SINUSITIS: Primary | ICD-10-CM

## 2023-10-02 DIAGNOSIS — J98.01 BRONCHOSPASM: ICD-10-CM

## 2023-10-02 DIAGNOSIS — J01.90 ACUTE BACTERIAL SINUSITIS: Primary | ICD-10-CM

## 2023-10-02 PROCEDURE — 99213 OFFICE O/P EST LOW 20 MIN: CPT | Performed by: FAMILY MEDICINE

## 2023-10-02 RX ORDER — DOXYCYCLINE HYCLATE 100 MG
100 TABLET ORAL 2 TIMES DAILY
Qty: 20 TABLET | Refills: 0 | Status: SHIPPED | OUTPATIENT
Start: 2023-10-02 | End: 2023-10-12

## 2023-10-02 RX ORDER — ALBUTEROL SULFATE 90 UG/1
2 AEROSOL, METERED RESPIRATORY (INHALATION) EVERY 4 HOURS PRN
Qty: 8.5 G | Refills: 4 | Status: SHIPPED | OUTPATIENT
Start: 2023-10-02 | End: 2024-02-05

## 2023-10-02 NOTE — PATIENT INSTRUCTIONS

## 2023-10-02 NOTE — TELEPHONE ENCOUNTER
RN contacted patient for more information regarding sinus symptoms--see 10/2/23 TE. Patient scheduled with Dr. Garcia 10/2/23 at 11:20AM.

## 2023-10-02 NOTE — PROGRESS NOTES
1. Acute bacterial sinusitis  This is a 42 yo female with recent symptoms suggestive of acute sinusitis.  Will treat with Doxycycline (allergic to PCN).  Discussed symptomatic cares.    - doxycycline hyclate (VIBRA-TABS) 100 MG tablet; Take 1 tablet (100 mg) by mouth 2 times daily for 10 days  Dispense: 20 tablet; Refill: 0    2. Bronchospasm  As above - with some postnasal drip - and wheezing/congestion in lower airways.    - albuterol (PROAIR HFA/PROVENTIL HFA/VENTOLIN HFA) 108 (90 Base) MCG/ACT inhaler; Inhale 2 puffs into the lungs every 4 hours as needed for shortness of breath or wheezing  Dispense: 8.5 g; Refill: 4      Subjective   Sejal is a 43 year old, presenting for the following health issues:  Sinus Problem (Congestion, runny nose, throat drainage causing cough x2 days, had 2 negative at home covid tests)        10/2/2023    11:52 AM   Additional Questions   Roomed by Briseida Patino across face -   Mild fever  Postnasal drip  Cough with wheezing/  Feels miserable -     History of Present Illness       Reason for visit:  Follow up    She eats 0-1 servings of fruits and vegetables daily.She consumes 3 sweetened beverage(s) daily.She exercises with enough effort to increase her heart rate 20 to 29 minutes per day.  She exercises with enough effort to increase her heart rate 3 or less days per week.        Review of Systems   Constitutional:  Positive for fatigue and fever. Negative for chills.   HENT:  Positive for congestion, postnasal drip, sinus pressure, sinus pain and sore throat. Negative for ear pain.    Eyes:  Negative for pain and visual disturbance.   Respiratory:  Positive for cough and wheezing. Negative for shortness of breath.    Cardiovascular:  Negative for chest pain, palpitations and peripheral edema.   Gastrointestinal:  Negative for abdominal pain, constipation and diarrhea.   Endocrine: Negative for polyuria.   Genitourinary:  Negative for dysuria, frequency and vaginal  "discharge.   Musculoskeletal:  Negative for arthralgias and myalgias.   Skin:  Negative for rash.   Allergic/Immunologic: Negative.    Neurological:  Negative for dizziness, weakness, headaches and paresthesias.   Hematological:  Does not bruise/bleed easily.   Psychiatric/Behavioral: Negative.     All other systems reviewed and are negative.           Objective    /80 (BP Location: Right arm, Patient Position: Sitting, Cuff Size: Adult Large)   Pulse 72   Temp 98  F (36.7  C) (Temporal)   Resp 20   Ht 1.65 m (5' 4.96\")   Wt 130.2 kg (287 lb)   LMP 09/18/2023 (Approximate)   SpO2 97%   BMI 47.82 kg/m    Body mass index is 47.82 kg/m .  Physical Exam  Vitals reviewed.   Constitutional:       General: She is not in acute distress.     Appearance: Normal appearance.   HENT:      Head: Normocephalic.      Right Ear: Tympanic membrane, ear canal and external ear normal.      Left Ear: Tympanic membrane, ear canal and external ear normal.      Nose: Congestion present.      Mouth/Throat:      Mouth: Mucous membranes are moist.      Pharynx: Posterior oropharyngeal erythema present.   Eyes:      Extraocular Movements: Extraocular movements intact.      Conjunctiva/sclera: Conjunctivae normal.      Pupils: Pupils are equal, round, and reactive to light.   Cardiovascular:      Rate and Rhythm: Normal rate and regular rhythm.      Pulses: Normal pulses.      Heart sounds: Normal heart sounds. No murmur heard.  Pulmonary:      Effort: Pulmonary effort is normal.      Breath sounds: Wheezing present.   Abdominal:      Palpations: Abdomen is soft. There is no mass.      Tenderness: There is no abdominal tenderness. There is no guarding or rebound.   Musculoskeletal:         General: No deformity. Normal range of motion.      Cervical back: Normal range of motion and neck supple.   Lymphadenopathy:      Cervical: No cervical adenopathy.   Skin:     General: Skin is warm and dry.   Neurological:      General: No " focal deficit present.      Mental Status: She is alert.   Psychiatric:         Mood and Affect: Mood normal.         Behavior: Behavior normal.            No results found for any visits on 10/02/23.

## 2023-10-02 NOTE — TELEPHONE ENCOUNTER
"Received Enkia message from patient 10/1/23:    I have some sinus issues and my nose and around is swollen, can you please see me or be able to send in any meds?     RN contacted patient for additional information. Patient reports \"a lot\" of sinus pressure, nasopharyngeal drainage, throat pain, denies fever. Hx of sinus infections. Negative covid test 10/1/23. Symptoms started 9/30. Patient would like to be seen and evaluated. Scheduled with Dr. Garcia 10/2/23 at 11:40AM.      "

## 2023-10-08 ASSESSMENT — ENCOUNTER SYMPTOMS
DIZZINESS: 0
ARTHRALGIAS: 0
PARESTHESIAS: 0
CONSTIPATION: 0
FREQUENCY: 0
FATIGUE: 1
ABDOMINAL PAIN: 0
CHILLS: 0
BRUISES/BLEEDS EASILY: 0
PALPITATIONS: 0
DYSURIA: 0
FEVER: 1
WEAKNESS: 0
SHORTNESS OF BREATH: 0
HEADACHES: 0
EYE PAIN: 0
SINUS PRESSURE: 1
MYALGIAS: 0
WHEEZING: 1
SINUS PAIN: 1
SORE THROAT: 1
ALLERGIC/IMMUNOLOGIC NEGATIVE: 1
DIARRHEA: 0
PSYCHIATRIC NEGATIVE: 1
COUGH: 1

## 2023-10-12 DIAGNOSIS — G25.81 RESTLESS LEGS SYNDROME: ICD-10-CM

## 2023-10-12 RX ORDER — ROPINIROLE 0.5 MG/1
TABLET, FILM COATED ORAL
Qty: 60 TABLET | Refills: 3 | Status: SHIPPED | OUTPATIENT
Start: 2023-10-12

## 2023-10-25 ENCOUNTER — THERAPY VISIT (OUTPATIENT)
Dept: PHYSICAL THERAPY | Facility: REHABILITATION | Age: 43
End: 2023-10-25
Payer: COMMERCIAL

## 2023-10-25 DIAGNOSIS — M25.671 DECREASED RANGE OF MOTION OF RIGHT ANKLE: Primary | ICD-10-CM

## 2023-10-25 PROCEDURE — 97140 MANUAL THERAPY 1/> REGIONS: CPT | Mod: GP | Performed by: PHYSICAL THERAPIST

## 2023-10-25 PROCEDURE — 97110 THERAPEUTIC EXERCISES: CPT | Mod: GP | Performed by: PHYSICAL THERAPIST

## 2023-10-25 PROCEDURE — 97112 NEUROMUSCULAR REEDUCATION: CPT | Mod: GP | Performed by: PHYSICAL THERAPIST

## 2023-10-30 ENCOUNTER — OFFICE VISIT (OUTPATIENT)
Dept: FAMILY MEDICINE | Facility: CLINIC | Age: 43
End: 2023-10-30
Payer: COMMERCIAL

## 2023-10-30 VITALS
SYSTOLIC BLOOD PRESSURE: 132 MMHG | WEIGHT: 289 LBS | DIASTOLIC BLOOD PRESSURE: 86 MMHG | OXYGEN SATURATION: 97 % | HEART RATE: 69 BPM | BODY MASS INDEX: 48.15 KG/M2 | RESPIRATION RATE: 18 BRPM | TEMPERATURE: 97.5 F | HEIGHT: 65 IN

## 2023-10-30 DIAGNOSIS — Z23 NEED FOR PNEUMOCOCCAL 20-VALENT CONJUGATE VACCINATION: ICD-10-CM

## 2023-10-30 DIAGNOSIS — L71.9 ROSACEA: ICD-10-CM

## 2023-10-30 DIAGNOSIS — F33.2 SEVERE EPISODE OF RECURRENT MAJOR DEPRESSIVE DISORDER, WITHOUT PSYCHOTIC FEATURES (H): Primary | ICD-10-CM

## 2023-10-30 DIAGNOSIS — F41.9 ANXIETY: ICD-10-CM

## 2023-10-30 DIAGNOSIS — Z23 NEED FOR COVID-19 VACCINE: ICD-10-CM

## 2023-10-30 PROCEDURE — 90480 ADMN SARSCOV2 VAC 1/ONLY CMP: CPT | Performed by: FAMILY MEDICINE

## 2023-10-30 PROCEDURE — 90677 PCV20 VACCINE IM: CPT | Performed by: FAMILY MEDICINE

## 2023-10-30 PROCEDURE — 91320 SARSCV2 VAC 30MCG TRS-SUC IM: CPT | Performed by: FAMILY MEDICINE

## 2023-10-30 PROCEDURE — 99214 OFFICE O/P EST MOD 30 MIN: CPT | Mod: 25 | Performed by: FAMILY MEDICINE

## 2023-10-30 PROCEDURE — 90471 IMMUNIZATION ADMIN: CPT | Performed by: FAMILY MEDICINE

## 2023-10-30 RX ORDER — METRONIDAZOLE 7.5 MG/G
GEL TOPICAL 2 TIMES DAILY
Qty: 45 G | Refills: 1 | Status: SHIPPED | OUTPATIENT
Start: 2023-10-30

## 2023-10-30 ASSESSMENT — ANXIETY QUESTIONNAIRES
7. FEELING AFRAID AS IF SOMETHING AWFUL MIGHT HAPPEN: SEVERAL DAYS
GAD7 TOTAL SCORE: 13
1. FEELING NERVOUS, ANXIOUS, OR ON EDGE: MORE THAN HALF THE DAYS
IF YOU CHECKED OFF ANY PROBLEMS ON THIS QUESTIONNAIRE, HOW DIFFICULT HAVE THESE PROBLEMS MADE IT FOR YOU TO DO YOUR WORK, TAKE CARE OF THINGS AT HOME, OR GET ALONG WITH OTHER PEOPLE: EXTREMELY DIFFICULT
3. WORRYING TOO MUCH ABOUT DIFFERENT THINGS: MORE THAN HALF THE DAYS
2. NOT BEING ABLE TO STOP OR CONTROL WORRYING: MORE THAN HALF THE DAYS
5. BEING SO RESTLESS THAT IT IS HARD TO SIT STILL: MORE THAN HALF THE DAYS
GAD7 TOTAL SCORE: 13
6. BECOMING EASILY ANNOYED OR IRRITABLE: MORE THAN HALF THE DAYS
4. TROUBLE RELAXING: MORE THAN HALF THE DAYS

## 2023-10-30 ASSESSMENT — ENCOUNTER SYMPTOMS
EYE PAIN: 0
ALLERGIC/IMMUNOLOGIC NEGATIVE: 1
FEVER: 0
FREQUENCY: 0
SORE THROAT: 0
BRUISES/BLEEDS EASILY: 0
CHILLS: 0
DIZZINESS: 0
SHORTNESS OF BREATH: 0
PARESTHESIAS: 0
ABDOMINAL PAIN: 0
DIARRHEA: 0
CONSTIPATION: 0
PALPITATIONS: 0
ARTHRALGIAS: 0
COUGH: 0
HEADACHES: 0
MYALGIAS: 0
NERVOUS/ANXIOUS: 1
WEAKNESS: 0
DYSURIA: 0

## 2023-10-30 NOTE — PROGRESS NOTES
"1. Severe episode of recurrent major depressive disorder, without psychotic features (H)  2. Anxiety  This is a 44 yo female who is currently away from work due to mental health issues - she is working with a therapist currently.  Feels like she is learning some coping strategies.  Therapy is helpful.  Will remain out of work for now - she is just starting to enjoy some relief of her anxiety/depression symptoms.  She is just starting to work on coping strategies.  Will remain out of work for an additional month (see paperwork regarding this).      3. Need for COVID-19 vaccine  Due for COVID-19 vaccine - discussed - ordered   - COVID-19 12+ (2023-24) (PFIZER)    4. Need for pneumococcal 20-valent conjugate vaccination  Due for pneumococcal vaccine - discussed - ordered  - Pneumococcal 20 Valent Conjugate (Prevnar 20)    5. Rosacea  Patient continues to have vascular appearing prominence on cheeks (L>R) - will try Metronidazole gel.    - metroNIDAZOLE (METROGEL) 0.75 % external gel; Apply topically 2 times daily  Dispense: 45 g; Refill: 1        Subjective   Sejal is a 43 year old, presenting for the following health issues:  Forms (Discuss extending leave from work)        10/30/2023     8:43 AM   Additional Questions   Roomed by Kenisha       Has been to physical therapy for ankle x 2  Went to therapy - counseling - let's her talk - \"what's my next step?\"  Got approved through Nov 15 - return on November 16     Doesn't feel truly ready to go back to work at this time  Would be same mental state  Wants to develop strategies for coping    Therapist thinks she's doing appropriate things for coping   But keeping self occupied -   Enjoying the lack of stress from job right now     Was past due for mortgage payment - that stressed her more -     Decluttering home  Reading helps - at night -    Thinking she will want to extend her leave   Extend to end of November -   Return to work on 12/4 (Monday)    Feels like adding " work into equation right now would continue setback -   But feels like she's making progress  Started 10/2 -         History of Present Illness       Mental Health Follow-up:  Patient presents to follow-up on Depression & Anxiety.Patient's depression since last visit has been:  No change  The patient is not having other symptoms associated with depression.  Patient's anxiety since last visit has been:  No change  The patient is not having other symptoms associated with anxiety.  Any significant life events: job concerns and grief or loss  Patient is not feeling anxious or having panic attacks.  Patient has no concerns about alcohol or drug use.    She eats 0-1 servings of fruits and vegetables daily.She consumes 3 sweetened beverage(s) daily.She exercises with enough effort to increase her heart rate 20 to 29 minutes per day.  She exercises with enough effort to increase her heart rate 3 or less days per week. She is missing 5 dose(s) of medications per week.       Review of Systems   Constitutional:  Negative for chills and fever.   HENT:  Negative for congestion, ear pain and sore throat.    Eyes:  Negative for pain and visual disturbance.   Respiratory:  Negative for cough and shortness of breath.    Cardiovascular:  Negative for chest pain, palpitations and peripheral edema.   Gastrointestinal:  Negative for abdominal pain, constipation and diarrhea.   Endocrine: Negative for polyuria.   Genitourinary:  Negative for dysuria, frequency and vaginal discharge.   Musculoskeletal:  Negative for arthralgias and myalgias.   Skin:  Negative for rash.   Allergic/Immunologic: Negative.    Neurological:  Negative for dizziness, weakness, headaches and paresthesias.   Hematological:  Does not bruise/bleed easily.   Psychiatric/Behavioral:  Positive for mood changes. The patient is nervous/anxious.    All other systems reviewed and are negative.           Objective    /86 (BP Location: Right arm, Patient Position:  "Sitting, Cuff Size: Adult Regular)   Pulse 69   Temp 97.5  F (36.4  C) (Temporal)   Resp 18   Ht 1.648 m (5' 4.9\")   Wt 131.1 kg (289 lb)   LMP 09/10/2023 (Approximate)   SpO2 97%   BMI 48.24 kg/m    Body mass index is 48.24 kg/m .  Physical Exam  Vitals reviewed.   Constitutional:       General: She is not in acute distress.     Appearance: Normal appearance.   HENT:      Head: Normocephalic.      Right Ear: Tympanic membrane, ear canal and external ear normal.      Left Ear: Tympanic membrane, ear canal and external ear normal.      Nose: Nose normal.      Mouth/Throat:      Mouth: Mucous membranes are moist.      Pharynx: No posterior oropharyngeal erythema.   Eyes:      Extraocular Movements: Extraocular movements intact.      Conjunctiva/sclera: Conjunctivae normal.      Pupils: Pupils are equal, round, and reactive to light.   Cardiovascular:      Rate and Rhythm: Normal rate and regular rhythm.      Pulses: Normal pulses.      Heart sounds: Normal heart sounds. No murmur heard.  Pulmonary:      Effort: Pulmonary effort is normal.      Breath sounds: Normal breath sounds.   Abdominal:      Palpations: Abdomen is soft. There is no mass.      Tenderness: There is no abdominal tenderness. There is no guarding or rebound.   Musculoskeletal:         General: No deformity. Normal range of motion.      Cervical back: Normal range of motion and neck supple.   Lymphadenopathy:      Cervical: No cervical adenopathy.   Skin:     General: Skin is warm and dry.   Neurological:      General: No focal deficit present.      Mental Status: She is alert.   Psychiatric:         Mood and Affect: Mood normal.         Behavior: Behavior normal.            No results found for this or any previous visit (from the past 24 hour(s)).            Prior to immunization administration, verified patients identity using patient s name and date of birth. Please see Immunization Activity for additional information.     Screening " Questionnaire for Adult Immunization    Are you sick today?   No   Do you have allergies to medications, food, a vaccine component or latex?   Yes   Have you ever had a serious reaction after receiving a vaccination?   No   Do you have a long-term health problem with heart, lung, kidney, or metabolic disease (e.g., diabetes), asthma, a blood disorder, no spleen, complement component deficiency, a cochlear implant, or a spinal fluid leak?  Are you on long-term aspirin therapy?   No   Do you have cancer, leukemia, HIV/AIDS, or any other immune system problem?   No   Do you have a parent, brother, or sister with an immune system problem?   No   In the past 3 months, have you taken medications that affect  your immune system, such as prednisone, other steroids, or anticancer drugs; drugs for the treatment of rheumatoid arthritis, Crohn s disease, or psoriasis; or have you had radiation treatments?   No   Have you had a seizure, or a brain or other nervous system problem?   No   During the past year, have you received a transfusion of blood or blood    products, or been given immune (gamma) globulin or antiviral drug?   No   For women: Are you pregnant or is there a chance you could become       pregnant during the next month?   No   Have you received any vaccinations in the past 4 weeks?   Yes     Immunization questionnaire was positive for at least one answer.        Patient instructed to remain in clinic for 15 minutes afterwards, and to report any adverse reactions.     Screening performed by Kenisha Crain RN on 10/30/2023 at 8:46 AM.

## 2023-11-20 ENCOUNTER — TELEPHONE (OUTPATIENT)
Dept: FAMILY MEDICINE | Facility: CLINIC | Age: 43
End: 2023-11-20
Payer: COMMERCIAL

## 2023-11-20 NOTE — TELEPHONE ENCOUNTER
Forms/Letter Request    Type of form/letter: Work[employer] faxed Extended lease for short term from [employer] on Friday 11/17/2023. Please call patient back to confirm if form was received.    Have you been seen for this request: N/A    Do we have the form/letter: Yes: Form faxed in on Friday 11/17/223    Who is the form from? Insurance comp [employer].    Where did/will the form come from? form was faxed in    When is form/letter needed by: as soon as possible.    How would you like the form/letter returned: Fax : to fax number provided on form.    Patient Notified form requests are processed in 3-5 business days:No    Could we send this information to you in [email or web address] or would you prefer to receive a phone call?:   Patient would prefer a phone call   Okay to leave a detailed message?: Yes at Cell number on file:    Telephone Information:   Mobile [phone]

## 2023-11-22 ENCOUNTER — OFFICE VISIT (OUTPATIENT)
Dept: FAMILY MEDICINE | Facility: CLINIC | Age: 43
End: 2023-11-22
Payer: COMMERCIAL

## 2023-11-22 VITALS
RESPIRATION RATE: 18 BRPM | SYSTOLIC BLOOD PRESSURE: 120 MMHG | BODY MASS INDEX: 47.65 KG/M2 | HEIGHT: 65 IN | HEART RATE: 70 BPM | OXYGEN SATURATION: 97 % | TEMPERATURE: 97.4 F | DIASTOLIC BLOOD PRESSURE: 85 MMHG | WEIGHT: 286 LBS

## 2023-11-22 DIAGNOSIS — F41.9 ANXIETY: ICD-10-CM

## 2023-11-22 DIAGNOSIS — F33.2 SEVERE EPISODE OF RECURRENT MAJOR DEPRESSIVE DISORDER, WITHOUT PSYCHOTIC FEATURES (H): Primary | ICD-10-CM

## 2023-11-22 DIAGNOSIS — Z02.89 ENCOUNTER FOR COMPLETION OF FORM WITH PATIENT: ICD-10-CM

## 2023-11-22 PROCEDURE — 99213 OFFICE O/P EST LOW 20 MIN: CPT | Performed by: FAMILY MEDICINE

## 2023-11-22 ASSESSMENT — PATIENT HEALTH QUESTIONNAIRE - PHQ9
SUM OF ALL RESPONSES TO PHQ QUESTIONS 1-9: 9
SUM OF ALL RESPONSES TO PHQ QUESTIONS 1-9: 9
10. IF YOU CHECKED OFF ANY PROBLEMS, HOW DIFFICULT HAVE THESE PROBLEMS MADE IT FOR YOU TO DO YOUR WORK, TAKE CARE OF THINGS AT HOME, OR GET ALONG WITH OTHER PEOPLE: SOMEWHAT DIFFICULT

## 2023-11-22 NOTE — PROGRESS NOTES
1. Severe episode of recurrent major depressive disorder, without psychotic features (H)  2. Anxiety  3. Encounter for completion of form with patient  This is a 42 yo female here for follow up regarding work-ability.  Needs paperwork completed for [place].  Reviewed with patient.  She is receiving therapy through [facility] - feels like that has been helpful.  Getting better - feeling stronger and able to do more things.  Getting nervous about return to work - doesn't know if she's ready for return to the demands of the competitive workplace.  Discussed return date.  Will plan return to work on 12/11/2023.      .    Rashid Post is a 43 year old, presenting for the following health issues:  RECHECK        11/22/2023     7:34 AM   Additional Questions   Roomed by Briseida   Accompanied by Daughter         11/22/2023     7:34 AM   Patient Reported Additional Medications   Patient reports taking the following new medications Ibuprofen PRN       Off work -   Gi  - faxed -   Extended leave -   Will go back on 12/11/2023 -   Seeing therapist - letting go of work - going through mom's belongings  Will continue to see therapist - once a week -   After today - will continue weekly -     Taking Fluoxetine - missed some doses - due to feeling better      Went to chiropractor for back pain -         History of Present Illness       Reason for visit:  Follow up    She eats 0-1 servings of fruits and vegetables daily.She consumes 4 sweetened beverage(s) daily.She exercises with enough effort to increase her heart rate 20 to 29 minutes per day.  She exercises with enough effort to increase her heart rate 4 days per week. She is missing 4 dose(s) of medications per week.       Review of Systems   Constitutional:  Negative for chills and fever.   HENT:  Negative for congestion, ear pain and sore throat.    Eyes:  Negative for pain and visual disturbance.   Respiratory:  Negative for cough and shortness of breath.   "  Cardiovascular:  Negative for chest pain, palpitations and peripheral edema.   Gastrointestinal:  Negative for abdominal pain, constipation and diarrhea.   Endocrine: Negative for polyuria.   Genitourinary:  Negative for dysuria, frequency and vaginal discharge.   Musculoskeletal:  Negative for arthralgias and myalgias.   Skin:  Negative for rash.   Allergic/Immunologic: Negative.    Neurological:  Negative for dizziness, weakness, headaches and paresthesias.   Hematological:  Does not bruise/bleed easily.   Psychiatric/Behavioral:  Positive for mood changes. The patient is nervous/anxious.             Objective    /85 (BP Location: Right arm, Patient Position: Sitting, Cuff Size: Adult Large)   Pulse 70   Temp 97.4  F (36.3  C) (Temporal)   Resp 18   Ht 1.64 m (5' 4.57\")   Wt 129.7 kg (286 lb)   LMP 11/18/2023 (Exact Date)   SpO2 97%   BMI 48.23 kg/m    Body mass index is 48.23 kg/m .  Physical Exam  Vitals reviewed.   Constitutional:       General: She is not in acute distress.     Appearance: Normal appearance.   HENT:      Head: Normocephalic.      Right Ear: Tympanic membrane, ear canal and external ear normal.      Left Ear: Tympanic membrane, ear canal and external ear normal.      Nose: Nose normal.      Mouth/Throat:      Mouth: Mucous membranes are moist.      Pharynx: No posterior oropharyngeal erythema.   Eyes:      Extraocular Movements: Extraocular movements intact.      Conjunctiva/sclera: Conjunctivae normal.      Pupils: Pupils are equal, round, and reactive to light.   Cardiovascular:      Rate and Rhythm: Normal rate and regular rhythm.      Pulses: Normal pulses.      Heart sounds: Normal heart sounds. No murmur heard.  Pulmonary:      Effort: Pulmonary effort is normal.      Breath sounds: Normal breath sounds.   Abdominal:      Palpations: Abdomen is soft. There is no mass.      Tenderness: There is no abdominal tenderness. There is no guarding or rebound.   Musculoskeletal:    "      General: No deformity. Normal range of motion.      Cervical back: Normal range of motion and neck supple.   Lymphadenopathy:      Cervical: No cervical adenopathy.   Skin:     General: Skin is warm and dry.   Neurological:      General: No focal deficit present.      Mental Status: She is alert.   Psychiatric:         Mood and Affect: Mood normal.         Behavior: Behavior normal.            No results found for any visits on 11/22/23.          Prior to immunization administration, verified patients identity using patient s name and date of birth. Please see Immunization Activity for additional information.     Screening Questionnaire for Adult Immunization    Are you sick today?   No   Do you have allergies to medications, food, a vaccine component or latex?   No   Have you ever had a serious reaction after receiving a vaccination?   No   Do you have a long-term health problem with heart, lung, kidney, or metabolic disease (e.g., diabetes), asthma, a blood disorder, no spleen, complement component deficiency, a cochlear implant, or a spinal fluid leak?  Are you on long-term aspirin therapy?   No   Do you have cancer, leukemia, HIV/AIDS, or any other immune system problem?   No   Do you have a parent, brother, or sister with an immune system problem?   No   In the past 3 months, have you taken medications that affect  your immune system, such as prednisone, other steroids, or anticancer drugs; drugs for the treatment of rheumatoid arthritis, Crohn s disease, or psoriasis; or have you had radiation treatments?   No   Have you had a seizure, or a brain or other nervous system problem?   No   During the past year, have you received a transfusion of blood or blood    products, or been given immune (gamma) globulin or antiviral drug?   No   For women: Are you pregnant or is there a chance you could become       pregnant during the next month?   No   Have you received any vaccinations in the past 4 weeks?   Yes      Immunization questionnaire was positive for at least one answer.  Notified Dr. Garcia.      Patient instructed to remain in clinic for 15 minutes afterwards, and to report any adverse reactions.     Screening performed by Briseida Gomez CMA on 11/22/2023 at 7:36 AM.

## 2023-11-26 ASSESSMENT — ENCOUNTER SYMPTOMS
FREQUENCY: 0
HEADACHES: 0
ARTHRALGIAS: 0
WEAKNESS: 0
EYE PAIN: 0
SORE THROAT: 0
FEVER: 0
CONSTIPATION: 0
DYSURIA: 0
COUGH: 0
DIZZINESS: 0
PARESTHESIAS: 0
PALPITATIONS: 0
CHILLS: 0
ABDOMINAL PAIN: 0
DIARRHEA: 0
NERVOUS/ANXIOUS: 1
ALLERGIC/IMMUNOLOGIC NEGATIVE: 1
SHORTNESS OF BREATH: 0
MYALGIAS: 0
BRUISES/BLEEDS EASILY: 0

## 2023-11-27 NOTE — TELEPHONE ENCOUNTER
Faxed completed forms to [employer] fax#[phone]. Advise pt of this and pt want copy of the original mailed to her. Copy to scan to HIM and mailed out original to pt as requested. Completing task.

## 2023-11-29 ENCOUNTER — OFFICE VISIT (OUTPATIENT)
Dept: FAMILY MEDICINE | Facility: CLINIC | Age: 43
End: 2023-11-29
Payer: COMMERCIAL

## 2023-11-29 VITALS
BODY MASS INDEX: 47.32 KG/M2 | HEART RATE: 69 BPM | OXYGEN SATURATION: 96 % | TEMPERATURE: 97.9 F | WEIGHT: 284 LBS | RESPIRATION RATE: 18 BRPM | DIASTOLIC BLOOD PRESSURE: 81 MMHG | HEIGHT: 65 IN | SYSTOLIC BLOOD PRESSURE: 126 MMHG

## 2023-11-29 DIAGNOSIS — S39.012A STRAIN OF LUMBAR REGION, INITIAL ENCOUNTER: Primary | ICD-10-CM

## 2023-11-29 PROCEDURE — 99213 OFFICE O/P EST LOW 20 MIN: CPT | Performed by: FAMILY MEDICINE

## 2023-11-29 RX ORDER — CYCLOBENZAPRINE HCL 5 MG
5 TABLET ORAL
Qty: 30 TABLET | Refills: 0 | Status: SHIPPED | OUTPATIENT
Start: 2023-11-29 | End: 2024-01-08

## 2023-11-29 ASSESSMENT — ENCOUNTER SYMPTOMS: BACK PAIN: 1

## 2023-11-29 NOTE — PROGRESS NOTES
"  1. Strain of lumbar region, initial encounter  This is a 42 yo female who went bowling  - felt like she strained her back - pain didn't improve - sought chiropractic care - was doing better, but now with increased low back pain.  Has muscle spasm on exam - no neurologic red flags - appears to be soft tissue injury - will add muscle relaxers at bedtime.    - cyclobenzaprine (FLEXERIL) 5 MG tablet; Take 1 tablet (5 mg) by mouth nightly as needed for muscle spasms  Dispense: 30 tablet; Refill: 0      Subjective   Sejal is a 43 year old, presenting for the following health issues:  Back Pain (Went bowling a few weeks ago was seen at the chiropractor, woke up the past 2 days with lower back pain)        11/29/2023    11:31 AM   Additional Questions   Roomed by Briseida         11/29/2023    11:31 AM   Patient Reported Additional Medications   Patient reports taking the following new medications Ibuprofen PRN       Back pain - after bowling -   Seen by chiropractor -   Re-aligned her, but \"sore\"    Started after bowling -   Pulled muscle in left thigh   Back was part of that, too        Back Pain   Pertinent negatives include no chest pain, no fever, no headaches, no abdominal pain, no dysuria, no paresthesias and no weakness.                  Review of Systems   Constitutional:  Negative for chills and fever.   HENT:  Negative for congestion, ear pain and sore throat.    Eyes:  Negative for pain and visual disturbance.   Respiratory:  Negative for cough and shortness of breath.    Cardiovascular:  Negative for chest pain, palpitations and peripheral edema.   Gastrointestinal:  Negative for abdominal pain, constipation and diarrhea.   Endocrine: Negative for polyuria.   Genitourinary:  Negative for dysuria, frequency and vaginal discharge.   Musculoskeletal:  Positive for back pain. Negative for arthralgias and myalgias.   Skin:  Negative for rash.   Allergic/Immunologic: Negative.    Neurological:  Negative for " "dizziness, weakness, headaches and paresthesias.   Hematological:  Does not bruise/bleed easily.   Psychiatric/Behavioral: Negative.     All other systems reviewed and are negative.           Objective    /81 (BP Location: Left arm, Patient Position: Sitting, Cuff Size: Adult Large)   Pulse 69   Temp 97.9  F (36.6  C) (Temporal)   Resp 18   Ht 1.64 m (5' 4.57\")   Wt 128.8 kg (284 lb)   LMP 11/18/2023 (Exact Date)   SpO2 96%   BMI 47.90 kg/m    Body mass index is 47.9 kg/m .  Physical Exam  Vitals reviewed.   Constitutional:       General: She is not in acute distress.     Appearance: Normal appearance.   HENT:      Head: Normocephalic.      Right Ear: Tympanic membrane, ear canal and external ear normal.      Left Ear: Tympanic membrane, ear canal and external ear normal.      Nose: Nose normal.      Mouth/Throat:      Mouth: Mucous membranes are moist.      Pharynx: No posterior oropharyngeal erythema.   Eyes:      Extraocular Movements: Extraocular movements intact.      Conjunctiva/sclera: Conjunctivae normal.      Pupils: Pupils are equal, round, and reactive to light.   Cardiovascular:      Rate and Rhythm: Normal rate and regular rhythm.      Pulses: Normal pulses.      Heart sounds: Normal heart sounds. No murmur heard.  Pulmonary:      Effort: Pulmonary effort is normal.      Breath sounds: Normal breath sounds.   Abdominal:      Palpations: Abdomen is soft. There is no mass.      Tenderness: There is no abdominal tenderness. There is no guarding or rebound.   Musculoskeletal:         General: No deformity. Normal range of motion.      Cervical back: Normal range of motion and neck supple.      Comments: Neg SLR - mild lumbar tenderness   Lymphadenopathy:      Cervical: No cervical adenopathy.   Skin:     General: Skin is warm and dry.   Neurological:      General: No focal deficit present.      Mental Status: She is alert.   Psychiatric:         Mood and Affect: Mood normal.         Behavior: " Behavior normal.            No results found for any visits on 11/29/23.

## 2023-12-10 ASSESSMENT — ENCOUNTER SYMPTOMS
HEADACHES: 0
DIZZINESS: 0
EYE PAIN: 0
CONSTIPATION: 0
PSYCHIATRIC NEGATIVE: 1
SORE THROAT: 0
PARESTHESIAS: 0
WEAKNESS: 0
ARTHRALGIAS: 0
SHORTNESS OF BREATH: 0
FREQUENCY: 0
COUGH: 0
ABDOMINAL PAIN: 0
PALPITATIONS: 0
BRUISES/BLEEDS EASILY: 0
ALLERGIC/IMMUNOLOGIC NEGATIVE: 1
DIARRHEA: 0
MYALGIAS: 0
FEVER: 0
CHILLS: 0
DYSURIA: 0

## 2024-01-05 DIAGNOSIS — S39.012A STRAIN OF LUMBAR REGION, INITIAL ENCOUNTER: ICD-10-CM

## 2024-01-08 RX ORDER — CYCLOBENZAPRINE HCL 5 MG
5 TABLET ORAL
Qty: 30 TABLET | Refills: 0 | Status: SHIPPED | OUTPATIENT
Start: 2024-01-08 | End: 2024-02-04

## 2024-01-10 ENCOUNTER — NURSE TRIAGE (OUTPATIENT)
Dept: FAMILY MEDICINE | Facility: CLINIC | Age: 44
End: 2024-01-10

## 2024-01-10 ENCOUNTER — VIRTUAL VISIT (OUTPATIENT)
Dept: FAMILY MEDICINE | Facility: CLINIC | Age: 44
End: 2024-01-10
Payer: COMMERCIAL

## 2024-01-10 DIAGNOSIS — J06.9 VIRAL URI WITH COUGH: ICD-10-CM

## 2024-01-10 DIAGNOSIS — S39.012A BACK STRAIN, INITIAL ENCOUNTER: ICD-10-CM

## 2024-01-10 PROCEDURE — 99213 OFFICE O/P EST LOW 20 MIN: CPT | Mod: 95 | Performed by: FAMILY MEDICINE

## 2024-01-10 RX ORDER — BENZONATATE 100 MG/1
100 CAPSULE ORAL 3 TIMES DAILY PRN
Qty: 30 CAPSULE | Refills: 0 | Status: SHIPPED | OUTPATIENT
Start: 2024-01-10

## 2024-01-10 ASSESSMENT — PATIENT HEALTH QUESTIONNAIRE - PHQ9
SUM OF ALL RESPONSES TO PHQ QUESTIONS 1-9: 5
SUM OF ALL RESPONSES TO PHQ QUESTIONS 1-9: 5
10. IF YOU CHECKED OFF ANY PROBLEMS, HOW DIFFICULT HAVE THESE PROBLEMS MADE IT FOR YOU TO DO YOUR WORK, TAKE CARE OF THINGS AT HOME, OR GET ALONG WITH OTHER PEOPLE: SOMEWHAT DIFFICULT

## 2024-01-10 NOTE — TELEPHONE ENCOUNTER
S-(situation): Worsening cough with bilateral flank and back pain.    B-(background): Pt report onset of cough started Saturday 1/6/24 and worsening. Cough is more dry but occasionally with yellowish-green phlegm. When coughing pt feels straining pain in bilateral flank and back. Home COVID rapid test was negative. Had fever the first 3 days but has been afebrile there after. Pt also experience sneezing with increase nasal drainage.    A-(assessment): dry productive coughing spasm with bilateral flank and back pain. No breathing difficulty but becomes short of breath needing her rescue inhaler.     R-(recommendations): Virtual visit for further evaluation related to coughing with bilateral flank and back pain.  Appt schedule for today at 11:20 via Virtual.     June ROCHA RN  [facility]      Reason for Disposition   MILD difficulty breathing (e.g., minimal/no SOB at rest, SOB with walking, pulse <100) and still present when not coughing    Additional Information   Negative: Bluish (or gray) lips or face   Negative: SEVERE difficulty breathing (e.g., struggling for each breath, speaks in single words)   Negative: Rapid onset of cough and has hives   Negative: Coughing started suddenly after medicine, an allergic food or bee sting   Negative: Difficulty breathing after exposure to flames, smoke, or fumes   Negative: Sounds like a life-threatening emergency to the triager   Negative: Previous asthma attacks and this feels like asthma attack   Negative: Dry cough (non-productive; no sputum or minimal clear sputum) and within 14 days of COVID-19 Exposure   Negative: MODERATE difficulty breathing (e.g., speaks in phrases, SOB even at rest, pulse 100-120) and still present when not coughing   Negative: Chest pain present when not coughing   Negative: Passed out (i.e., fainted, collapsed and was not responding)   Negative: Patient sounds very sick or weak to the triager   Negative: SEVERE coughing  "spells (e.g., whooping sound after coughing, vomiting after coughing)   Negative: Coughing up matthias-colored (reddish-brown) or blood-tinged sputum   Negative: Fever present > 3 days (72 hours)   Negative: Fever returns after gone for over 24 hours and symptoms worse or not improved   Negative: Using nasal washes and pain medicine > 24 hours and sinus pain persists   Negative: Known COPD or other severe lung disease (i.e., bronchiectasis, cystic fibrosis, lung surgery) and worsening symptoms (i.e., increased sputum purulence or amount, increased breathing difficulty)   Negative: Continuous (nonstop) coughing interferes with work or school and no improvement using cough treatment per Care Advice   Negative: Patient wants to be seen   Negative: Cough has been present for > 3 weeks   Negative: Allergy symptoms are also present (e.g., itchy eyes, clear nasal discharge, postnasal drip)   Negative: Nasal discharge present > 10 days   Negative: Exposure to TB (Tuberculosis)   Negative: Taking an ACE Inhibitor medicine (e.g., benazepril/LOTENSIN, captopril/CAPOTEN, enalapril/VASOTEC, lisinopril/ZESTRIL)    Answer Assessment - Initial Assessment Questions  1. ONSET: \"When did the cough begin?\"       Saturday 1/6/24  2. SEVERITY: \"How bad is the cough today?\"       mild  3. SPUTUM: \"Describe the color of your sputum\" (none, dry cough; clear, white, yellow, green)      Yellowish-green  4. HEMOPTYSIS: \"Are you coughing up any blood?\" If so ask: \"How much?\" (flecks, streaks, tablespoons, etc.)      None  5. DIFFICULTY BREATHING: \"Are you having difficulty breathing?\" If Yes, ask: \"How bad is it?\" (e.g., mild, moderate, severe)     - MILD: No SOB at rest, mild SOB with walking, speaks normally in sentences, can lie down, no retractions, pulse < 100.     - MODERATE: SOB at rest, SOB with minimal exertion and prefers to sit, cannot lie down flat, speaks in phrases, mild retractions, audible wheezing, pulse 100-120.     - SEVERE: " "Very SOB at rest, speaks in single words, struggling to breathe, sitting hunched forward, retractions, pulse > 120       Only when I cough, I can't catch my breath and have to use my rescue inhaler  6. FEVER: \"Do you have a fever?\" If Yes, ask: \"What is your temperature, how was it measured, and when did it start?\"      Had a fever the first 3 days but no fever today  7. CARDIAC HISTORY: \"Do you have any history of heart disease?\" (e.g., heart attack, congestive heart failure)       No  8. LUNG HISTORY: \"Do you have any history of lung disease?\"  (e.g., pulmonary embolus, asthma, emphysema)      No  9. PE RISK FACTORS: \"Do you have a history of blood clots?\" (or: recent major surgery, recent prolonged travel, bedridden)      No  10. OTHER SYMPTOMS: \"Do you have any other symptoms?\" (e.g., runny nose, wheezing, chest pain)        Running nose, and congestion  11. PREGNANCY: \"Is there any chance you are pregnant?\" \"When was your last menstrual period?\"        No, had tubes tied  12. TRAVEL: \"Have you traveled out of the country in the last month?\" (e.g., travel history, exposures)        No    Protocols used: Cough-A-OH    "

## 2024-01-10 NOTE — PROGRESS NOTES
Sejal is a 43 year old who is being evaluated via a billable video visit.      How would you like to obtain your AVS? MyChart  If the video visit is dropped, the invitation should be resent by: Text to cell phone: [phone]  Will anyone else be joining your video visit? No    1. Viral URI with cough  5 days duration, in a smoker  Some facial pressure, productive cough and now back pain, most likely associated with muscle strain  Recommend ongoing symptomatic treatment.  Her fever is better.  There is no shortness of breath.  Tessalon for cough  Follow-up if not improving 5 to 6 days  - benzonatate (TESSALON) 100 MG capsule; Take 1 capsule (100 mg) by mouth 3 times daily as needed for cough  Dispense: 30 capsule; Refill: 0    2. Back strain, initial encounter  Recommend ibuprofen or Tylenol    Upper Respiratory infection  Duration 5days  Worst Symptoms: cough  Runny nose?  Yes, with some sinus pressure  Loss of Taste/ smell?  No: And has checked a number of COVID test which are negative  Sore throat?  No  Cough/ productive or dry?  Yes: Becoming productive, started dry  Fever?  Yes: Nothing for the last 24 hours  HA/ achiness?  Yes: Initially  Current symptoms similar to those at the onset of this illness?  No, as above  Relevant exposure hx?  Yespeople in the family are sick as well        Video-Visit Details    Type of service:  Video Visit     Originating Location (pt. Location): Home    Distant Location (provider location):  On-site  Platform used for Video Visit: [employer]      Answers submitted by the patient for this visit:  Patient Health Questionnaire (Submitted on 1/10/2024)  If you checked off any problems, how difficult have these problems made it for you to do your work, take care of things at home, or get along with other people?: Somewhat difficult  PHQ9 TOTAL SCORE: 5  General Questionnaire (Submitted on 1/10/2024)  Chief Complaint: Chronic problems general questions HPI Form  What is the reason for  your visit today? : cough  How many servings of fruits and vegetables do you eat daily?: 2-3  On average, how many sweetened beverages do you drink each day (Examples: soda, juice, sweet tea, etc.  Do NOT count diet or artificially sweetened beverages)?: 2  How many minutes a day do you exercise enough to make your heart beat faster?: 30 to 60  How many days a week do you exercise enough to make your heart beat faster?: 4  How many days per week do you miss taking your medication?: 3  What makes it hard for you to take your medication every day?: remembering to take

## 2024-02-04 DIAGNOSIS — J98.01 BRONCHOSPASM: ICD-10-CM

## 2024-02-04 DIAGNOSIS — S39.012A STRAIN OF LUMBAR REGION, INITIAL ENCOUNTER: ICD-10-CM

## 2024-02-04 RX ORDER — CYCLOBENZAPRINE HCL 5 MG
5 TABLET ORAL
Qty: 30 TABLET | Refills: 0 | Status: SHIPPED | OUTPATIENT
Start: 2024-02-04

## 2024-02-05 RX ORDER — ALBUTEROL SULFATE 90 UG/1
2 AEROSOL, METERED RESPIRATORY (INHALATION) EVERY 4 HOURS PRN
Qty: 8.5 G | Refills: 4 | Status: SHIPPED | OUTPATIENT
Start: 2024-02-05 | End: 2024-05-30

## 2024-02-19 ENCOUNTER — NURSE TRIAGE (OUTPATIENT)
Dept: FAMILY MEDICINE | Facility: CLINIC | Age: 44
End: 2024-02-19
Payer: COMMERCIAL

## 2024-02-19 NOTE — TELEPHONE ENCOUNTER
"Scheduled virtual visit    Reason for Disposition   [1] Positive throat culture or rapid strep test (according to lab, PCP, caller, etc.) AND [2] NO  standing order to call in prescription for antibiotic    Additional Information   Negative: [1] Diagnosed strep throat AND [2] taking antibiotic AND [3] symptoms continue   Negative: Throat culture results, call about   Negative: Productive cough is main symptom   Negative: Non-productive cough is main symptom   Negative: Hoarseness is main symptom   Negative: Runny nose is main symptom   Negative: Uvula swelling is main symptom   Negative: [1] Drooling or spitting out saliva (because can't swallow) AND [2] normal breathing   Negative: Unable to open mouth completely   Negative: [1] Difficulty breathing AND [2] not severe   Negative: Fever > 104 F (40 C)   Negative: [1] Refuses to drink anything AND [2] for > 12 hours   Negative: [1] Drinking very little AND [2] dehydration suspected (e.g., no urine > 12 hours, very dry mouth, very lightheaded)   Negative: Patient sounds very sick or weak to the triager   Negative: SEVERE (e.g., excruciating) throat pain   Negative: [1] Pus on tonsils (back of throat) AND [2]  fever AND [3] swollen neck lymph nodes (\"glands\")   Negative: [1] Rash AND [2] widespread (especially chest and abdomen)   Negative: Earache also present   Negative: Fever present > 3 days (72 hours)   Negative: Diabetes mellitus or weak immune system (e.g., HIV positive, cancer chemo, splenectomy, organ transplant, chronic steroids)   Negative: History of rheumatic fever   Negative: [1] Adult is leaving on a trip AND [2] requests an antibiotic NOW    Answer Assessment - Initial Assessment Questions  1. ONSET: \"When did the throat start hurting?\" (Hours or days ago)       3 days  2. SEVERITY: \"How bad is the sore throat?\" (Scale 1-10; mild, moderate or severe)    - MILD (1-3):  Doesn't interfere with eating or normal activities.    - MODERATE (4-7): Interferes " "with eating some solids and normal activities.    - SEVERE (8-10):  Excruciating pain, interferes with most normal activities.    - SEVERE WITH DYSPHAGIA (10): Can't swallow liquids, drooling.      mild  3. STREP EXPOSURE: \"Has there been any exposure to strep within the past week?\" If Yes, ask: \"What type of contact occurred?\"       no  4.  VIRAL SYMPTOMS: \"Are there any symptoms of a cold, such as a runny nose, cough, hoarse voice or red eyes?\"       Negative Covid no other symptoms  5. FEVER: \"Do you have a fever?\" If Yes, ask: \"What is your temperature, how was it measured, and when did it start?\"      no  6. PUS ON THE TONSILS: \"Is there pus on the tonsils in the back of your throat?\"      yes  7. OTHER SYMPTOMS: \"Do you have any other symptoms?\" (e.g., difficulty breathing, headache, rash)      Sore when swallow  8. PREGNANCY: \"Is there any chance you are pregnant?\" \"When was your last menstrual period?\"      no    Protocols used: Sore Throat-A-AH    "

## 2024-03-09 ENCOUNTER — HEALTH MAINTENANCE LETTER (OUTPATIENT)
Age: 44
End: 2024-03-09

## 2024-03-11 PROBLEM — M25.671 DECREASED RANGE OF MOTION OF RIGHT ANKLE: Status: RESOLVED | Noted: 2023-09-18 | Resolved: 2024-03-11

## 2024-03-11 NOTE — PROGRESS NOTES
DISCHARGE  Reason for Discharge: Patient has failed to schedule further appointments.    Equipment Issued: N/A    Discharge Plan: Patient to continue home program.    Referring Provider:  Annie Vazquez      See last treatment note for further information.   Rakesh Jones, PT, CLT  3/11/2024

## 2024-03-14 ENCOUNTER — PATIENT OUTREACH (OUTPATIENT)
Dept: ONCOLOGY | Facility: CLINIC | Age: 44
End: 2024-03-14
Payer: COMMERCIAL

## 2024-03-14 DIAGNOSIS — Z80.9 FAMILY HISTORY OF CANCER: Primary | ICD-10-CM

## 2024-03-14 NOTE — PROGRESS NOTES
Writer received Cancer Risk Management Program referral, referred for:         Reviewed for appropriate plan, and sent to New Patient Scheduling for completion.

## 2024-04-17 DIAGNOSIS — F33.0 MILD EPISODE OF RECURRENT MAJOR DEPRESSIVE DISORDER (H): ICD-10-CM

## 2024-04-18 RX ORDER — FLUOXETINE 40 MG/1
CAPSULE ORAL
Qty: 90 CAPSULE | Refills: 0 | Status: SHIPPED | OUTPATIENT
Start: 2024-04-18

## 2024-04-18 NOTE — TELEPHONE ENCOUNTER
Medication has not been refilled for a year.  Per chart review, patient had been missing doses due to feeling better.  Will defer to PCP.

## 2024-05-29 DIAGNOSIS — J98.01 BRONCHOSPASM: ICD-10-CM

## 2024-05-30 RX ORDER — ALBUTEROL SULFATE 90 UG/1
2 AEROSOL, METERED RESPIRATORY (INHALATION) EVERY 4 HOURS PRN
Qty: 8.5 G | Refills: 4 | Status: SHIPPED | OUTPATIENT
Start: 2024-05-30 | End: 2024-09-22

## 2024-06-18 ENCOUNTER — OFFICE VISIT (OUTPATIENT)
Dept: FAMILY MEDICINE | Facility: CLINIC | Age: 44
End: 2024-06-18
Payer: COMMERCIAL

## 2024-06-18 ENCOUNTER — TELEPHONE (OUTPATIENT)
Dept: FAMILY MEDICINE | Facility: CLINIC | Age: 44
End: 2024-06-18

## 2024-06-18 VITALS
BODY MASS INDEX: 48.32 KG/M2 | WEIGHT: 290 LBS | HEIGHT: 65 IN | TEMPERATURE: 97.9 F | SYSTOLIC BLOOD PRESSURE: 130 MMHG | OXYGEN SATURATION: 97 % | DIASTOLIC BLOOD PRESSURE: 82 MMHG | RESPIRATION RATE: 18 BRPM | HEART RATE: 77 BPM

## 2024-06-18 DIAGNOSIS — S39.012S STRAIN OF LUMBAR REGION, SEQUELA: Primary | ICD-10-CM

## 2024-06-18 DIAGNOSIS — Z02.89 ENCOUNTER FOR COMPLETION OF FORM WITH PATIENT: ICD-10-CM

## 2024-06-18 PROCEDURE — 99212 OFFICE O/P EST SF 10 MIN: CPT | Performed by: FAMILY MEDICINE

## 2024-06-18 ASSESSMENT — PATIENT HEALTH QUESTIONNAIRE - PHQ9
10. IF YOU CHECKED OFF ANY PROBLEMS, HOW DIFFICULT HAVE THESE PROBLEMS MADE IT FOR YOU TO DO YOUR WORK, TAKE CARE OF THINGS AT HOME, OR GET ALONG WITH OTHER PEOPLE: SOMEWHAT DIFFICULT
SUM OF ALL RESPONSES TO PHQ QUESTIONS 1-9: 12
SUM OF ALL RESPONSES TO PHQ QUESTIONS 1-9: 12

## 2024-06-18 NOTE — PROGRESS NOTES
"  1. Strain of lumbar region, sequela  2. Encounter for completion of form with patient  This is a 43 yo female with chronic back pain - she has been working from home for several years now (due to pandemic).  Now has to go back to the office where there are shared work spaces.  She has needed specific ergonomic adjustments including chair/adjustable height desk/adjustable keyboard.  As long as she can assure this is available to her, she could return to the office.  Paperwork has been completed for this request.          Rashid Post is a 44 year old, presenting for the following health issues:  Recheck Medication and Forms (Work accomodation forms)        6/18/2024     9:08 AM   Additional Questions   Roomed by Briseida         6/18/2024   Forms   Any forms needing to be completed Yes     1.  At work, has sit/stand desk -   Needs paperwork for this   If sits too long then \"acts up\" at night - legs get rubbery - takes meds at night (can't take them during day)    Years ago, before here - had carpal tunnel in both hands;  Work comp - left is getting worse -   Didn't do surgery due to didn't want to have open surgery on her wrist     Restless legs/muscle spasms  At home - has decent set up - and can sit for 8 hours/day  Will be in [facility] [place] -     Has sit/stand desk with adjustable keyboard      History of Present Illness       Reason for visit:  Paperwork and followup on conditions    She eats 2-3 servings of fruits and vegetables daily.She consumes 3 sweetened beverage(s) daily.She exercises with enough effort to increase her heart rate 30 to 60 minutes per day.  She exercises with enough effort to increase her heart rate 7 days per week. She is missing 2 dose(s) of medications per week.  She is not taking prescribed medications regularly due to remembering to take.       Review of Systems   Constitutional:  Negative for chills and fever.   HENT:  Negative for ear pain and sore throat.    Eyes:  " "Negative for pain and visual disturbance.   Respiratory:  Negative for cough and shortness of breath.    Cardiovascular:  Negative for chest pain and palpitations.   Gastrointestinal:  Negative for abdominal pain and vomiting.   Genitourinary:  Negative for dysuria and hematuria.   Musculoskeletal:  Positive for back pain and neck pain. Negative for arthralgias.   Skin:  Negative for color change and rash.   Neurological:  Negative for seizures and syncope.   All other systems reviewed and are negative.          Objective    /82 (BP Location: Right arm, Patient Position: Sitting, Cuff Size: Adult Large)   Pulse 77   Temp 97.9  F (36.6  C) (Temporal)   Resp 18   Ht 1.64 m (5' 4.57\")   Wt 131.5 kg (290 lb)   LMP 05/20/2024 (Exact Date)   SpO2 97%   BMI 48.91 kg/m    Body mass index is 48.91 kg/m .  Physical Exam  Vitals reviewed.   Constitutional:       General: She is not in acute distress.     Appearance: Normal appearance.   HENT:      Head: Normocephalic.      Right Ear: Tympanic membrane, ear canal and external ear normal.      Left Ear: Tympanic membrane, ear canal and external ear normal.      Nose: Nose normal.      Mouth/Throat:      Mouth: Mucous membranes are moist.      Pharynx: No posterior oropharyngeal erythema.   Eyes:      Extraocular Movements: Extraocular movements intact.      Conjunctiva/sclera: Conjunctivae normal.      Pupils: Pupils are equal, round, and reactive to light.   Cardiovascular:      Rate and Rhythm: Normal rate and regular rhythm.      Pulses: Normal pulses.      Heart sounds: Normal heart sounds. No murmur heard.  Pulmonary:      Effort: Pulmonary effort is normal.      Breath sounds: Normal breath sounds.   Abdominal:      Palpations: Abdomen is soft. There is no mass.      Tenderness: There is no abdominal tenderness. There is no guarding or rebound.   Musculoskeletal:         General: No deformity. Normal range of motion.      Cervical back: Normal range of motion " and neck supple.      Comments: Slow transition from sitting to standing - stiffness in low back      Lymphadenopathy:      Cervical: No cervical adenopathy.   Skin:     General: Skin is warm and dry.   Neurological:      General: No focal deficit present.      Mental Status: She is alert.   Psychiatric:         Mood and Affect: Mood normal.         Behavior: Behavior normal.            No results found for any visits on 06/18/24.        Signed Electronically by: LARRY CASTILLO MD      Prior to immunization administration, verified patients identity using patient s name and date of birth. Please see Immunization Activity for additional information.     Screening Questionnaire for Adult Immunization    Are you sick today?   No   Do you have allergies to medications, food, a vaccine component or latex?   Yes   Have you ever had a serious reaction after receiving a vaccination?   No   Do you have a long-term health problem with heart, lung, kidney, or metabolic disease (e.g., diabetes), asthma, a blood disorder, no spleen, complement component deficiency, a cochlear implant, or a spinal fluid leak?  Are you on long-term aspirin therapy?   No   Do you have cancer, leukemia, HIV/AIDS, or any other immune system problem?   No   Do you have a parent, brother, or sister with an immune system problem?   No   In the past 3 months, have you taken medications that affect  your immune system, such as prednisone, other steroids, or anticancer drugs; drugs for the treatment of rheumatoid arthritis, Crohn s disease, or psoriasis; or have you had radiation treatments?   No   Have you had a seizure, or a brain or other nervous system problem?   No   During the past year, have you received a transfusion of blood or blood    products, or been given immune (gamma) globulin or antiviral drug?   No   For women: Are you pregnant or is there a chance you could become       pregnant during the next month?   No   Have you  received any vaccinations in the past 4 weeks?   No     Immunization questionnaire was positive for at least one answer.  Notified Dr. Garcia.      Patient instructed to remain in clinic for 15 minutes afterwards, and to report any adverse reactions.     Screening performed by Briseida Gomez CMA on 6/18/2024 at 9:10 AM.

## 2024-06-18 NOTE — LETTER
My Depression Action Plan  Name: Sejal Ball   Date of Birth 1980  Date: 6/18/2024    My doctor: Annie Vazquez   My clinic: [facility] [address] [place] [address]  [phone]            GREEN    ZONE   Good Control    What it looks like:   Things are going generally well. You have normal ups and downs. You may even feel depressed from time to time, but bad moods usually last less than a day.   What you need to do:  Continue to care for yourself (see self care plan)  Check your depression survival kit and update it as needed  Follow your physician s recommendations including any medication.  Do not stop taking medication unless you consult with your physician first.             YELLOW         ZONE Getting Worse    What it looks like:   Depression is starting to interfere with your life.   It may be hard to get out of bed; you may be starting to isolate yourself from others.  Symptoms of depression are starting to last most all day and this has happened for several days.   You may have suicidal thoughts but they are not constant.   What you need to do:     Call your care team. Your response to treatment will improve if you keep your care team informed of your progress. Yellow periods are signs an adjustment may need to be made.     Continue your self-care.  Just get dressed and ready for the day.  Don't give yourself time to talk yourself out of it.    Talk to someone in your support network.    Open up your Depression Self-Care Plan/Wellness Kit.             RED    ZONE Medical Alert - Get Help    What it looks like:   Depression is seriously interfering with your life.   You may experience these or other symptoms: You can t get out of bed most days, can t work or engage in other necessary activities, you have trouble taking care of basic hygiene, or basic responsibilities, thoughts of suicide or death that will not go away,  self-injurious behavior.     What you need to do:  Call your care team and request a same-day appointment. If they are not available (weekends or after hours) call your local crisis line, emergency room or 911.          Depression Self-Care Plan / Wellness Kit    Many people find that medication and therapy are helpful treatments for managing depression. In addition, making small changes to your everyday life can help to boost your mood and improve your wellbeing. Below are some tips for you to consider. Be sure to talk with your medical provider and/or behavioral health consultant if your symptoms are worsening or not improving.     Sleep   Sleep hygiene  means all of the habits that support good, restful sleep. It includes maintaining a consistent bedtime and wake time, using your bedroom only for sleeping or sex, and keeping the bedroom dark and free of distractions like a computer, smartphone, or television.     Develop a Healthy Routine  Maintain good hygiene. Get out of bed in the morning, make your bed, brush your teeth, take a shower, and get dressed. Don t spend too much time viewing media that makes you feel stressed. Find time to relax each day.    Exercise  Get some form of exercise every day. This will help reduce pain and release endorphins, the  feel good  chemicals in your brain. It can be as simple as just going for a walk or doing some gardening, anything that will get you moving.      Diet  Strive to eat healthy foods, including fruits and vegetables. Drink plenty of water. Avoid excessive sugar, caffeine, alcohol, and other mood-altering substances.     Stay Connected with Others  Stay in touch with friends and family members.    Manage Your Mood  Try deep breathing, massage therapy, biofeedback, or meditation. Take part in fun activities when you can. Try to find something to smile about each day.     Psychotherapy  Be open to working with a therapist if your provider recommends it.      Medication  Be sure to take your medication as prescribed. Most anti-depressants need to be taken every day. It usually takes several weeks for medications to work. Not all medicines work for all people. It is important to follow-up with your provider to make sure you have a treatment plan that is working for you. Do not stop your medication abruptly without first discussing it with your provider.    Crisis Resources   These hotlines are for both adults and children. They and are open 24 hours a day, 7 days a week unless noted otherwise.    National Suicide Prevention Lifeline   988 or 7-963-171-LRFX (5418)    Crisis Text Line    www.crisistextline.org  Text HOME to 667018 from anywhere in the [place], anytime, about any type of crisis. A live, trained crisis counselor will receive the text and respond quickly.    [facility] Lifeline for LGBTQ Youth  A national crisis intervention and suicide lifeline for LGBTQ youth under 25. Provides a safe place to talk without judgement. Call 1-544.367.2294; text START to 652042 or visit www.thetrevorproject.org to talk to a trained counselor.    For [facility] crisis numbers, visit the [facility] website at:  https://[place].gov/dhs/people-we-serve/adults/health-care/mental-health/resources/crisis-contacts.jsp

## 2024-06-18 NOTE — CONFIDENTIAL NOTE
Forms/Letter Request    Type of form/letter: OTHER: Work Accomodation Forms       Do we have the form/letter: Yes: patient brought with to appointment    Who is the form from? Patient    Where did/will the form come from? Patient or family brought in       When is form/letter needed by: ASAP    How would you like the form/letter returned:     Patient Notified form requests are processed in 5-7 business days:Yes    Could we send this information to you in [facility] or would you prefer to receive a phone call?:   No preference   Okay to leave a detailed message?: Yes at Home number on file [phone] (home)

## 2024-06-18 NOTE — CONFIDENTIAL NOTE
Forms were filled out at [facility], originals given to patient and copy faxed to Sonal at [phone].    Placed in urgent scan.

## 2024-06-23 ASSESSMENT — ENCOUNTER SYMPTOMS
COUGH: 0
EYE PAIN: 0
SORE THROAT: 0
FEVER: 0
COLOR CHANGE: 0
BACK PAIN: 1
SEIZURES: 0
PALPITATIONS: 0
ABDOMINAL PAIN: 0
VOMITING: 0
DYSURIA: 0
HEMATURIA: 0
NECK PAIN: 1
ARTHRALGIAS: 0
CHILLS: 0
SHORTNESS OF BREATH: 0

## 2024-06-27 ENCOUNTER — MYC MEDICAL ADVICE (OUTPATIENT)
Dept: FAMILY MEDICINE | Facility: CLINIC | Age: 44
End: 2024-06-27
Payer: COMMERCIAL

## 2024-09-13 ENCOUNTER — OFFICE VISIT (OUTPATIENT)
Dept: FAMILY MEDICINE | Facility: CLINIC | Age: 44
End: 2024-09-13
Payer: COMMERCIAL

## 2024-09-13 ENCOUNTER — ANCILLARY PROCEDURE (OUTPATIENT)
Dept: MAMMOGRAPHY | Facility: CLINIC | Age: 44
End: 2024-09-13
Attending: FAMILY MEDICINE
Payer: COMMERCIAL

## 2024-09-13 ENCOUNTER — ANCILLARY PROCEDURE (OUTPATIENT)
Dept: GENERAL RADIOLOGY | Facility: CLINIC | Age: 44
End: 2024-09-13
Attending: FAMILY MEDICINE
Payer: COMMERCIAL

## 2024-09-13 VITALS
HEIGHT: 66 IN | RESPIRATION RATE: 20 BRPM | DIASTOLIC BLOOD PRESSURE: 66 MMHG | HEART RATE: 72 BPM | OXYGEN SATURATION: 96 % | SYSTOLIC BLOOD PRESSURE: 129 MMHG | BODY MASS INDEX: 47.09 KG/M2 | WEIGHT: 293 LBS

## 2024-09-13 DIAGNOSIS — G89.29 CHRONIC NONINTRACTABLE HEADACHE, UNSPECIFIED HEADACHE TYPE: ICD-10-CM

## 2024-09-13 DIAGNOSIS — E66.01 MORBID OBESITY (H): ICD-10-CM

## 2024-09-13 DIAGNOSIS — Z12.31 VISIT FOR SCREENING MAMMOGRAM: Primary | ICD-10-CM

## 2024-09-13 DIAGNOSIS — R51.9 CHRONIC NONINTRACTABLE HEADACHE, UNSPECIFIED HEADACHE TYPE: ICD-10-CM

## 2024-09-13 DIAGNOSIS — Z23 NEED FOR IMMUNIZATION AGAINST INFLUENZA: ICD-10-CM

## 2024-09-13 DIAGNOSIS — F33.2 SEVERE EPISODE OF RECURRENT MAJOR DEPRESSIVE DISORDER, WITHOUT PSYCHOTIC FEATURES (H): ICD-10-CM

## 2024-09-13 DIAGNOSIS — R55 SYNCOPE, UNSPECIFIED SYNCOPE TYPE: ICD-10-CM

## 2024-09-13 DIAGNOSIS — Z00.00 ADULT GENERAL MEDICAL EXAM: ICD-10-CM

## 2024-09-13 DIAGNOSIS — Z12.31 VISIT FOR SCREENING MAMMOGRAM: ICD-10-CM

## 2024-09-13 LAB
ALBUMIN SERPL BCG-MCNC: 3.9 G/DL (ref 3.5–5.2)
ALP SERPL-CCNC: 89 U/L (ref 40–150)
ALT SERPL W P-5'-P-CCNC: 12 U/L (ref 0–50)
ANION GAP SERPL CALCULATED.3IONS-SCNC: 9 MMOL/L (ref 7–15)
AST SERPL W P-5'-P-CCNC: 19 U/L (ref 0–45)
BILIRUB SERPL-MCNC: 0.2 MG/DL
BUN SERPL-MCNC: 8 MG/DL (ref 6–20)
CALCIUM SERPL-MCNC: 8.4 MG/DL (ref 8.8–10.4)
CHLORIDE SERPL-SCNC: 108 MMOL/L (ref 98–107)
CHOLEST SERPL-MCNC: 133 MG/DL
CREAT SERPL-MCNC: 0.74 MG/DL (ref 0.51–0.95)
EGFRCR SERPLBLD CKD-EPI 2021: >90 ML/MIN/1.73M2
ERYTHROCYTE [DISTWIDTH] IN BLOOD BY AUTOMATED COUNT: 16.7 % (ref 10–15)
FASTING STATUS PATIENT QL REPORTED: NO
FASTING STATUS PATIENT QL REPORTED: NO
GLUCOSE SERPL-MCNC: 92 MG/DL (ref 70–99)
HCO3 SERPL-SCNC: 22 MMOL/L (ref 22–29)
HCT VFR BLD AUTO: 37.2 % (ref 35–47)
HDLC SERPL-MCNC: 35 MG/DL
HGB BLD-MCNC: 11.3 G/DL (ref 11.7–15.7)
LDLC SERPL CALC-MCNC: 64 MG/DL
MCH RBC QN AUTO: 23.6 PG (ref 26.5–33)
MCHC RBC AUTO-ENTMCNC: 30.4 G/DL (ref 31.5–36.5)
MCV RBC AUTO: 78 FL (ref 78–100)
NONHDLC SERPL-MCNC: 98 MG/DL
PLATELET # BLD AUTO: 328 10E3/UL (ref 150–450)
POTASSIUM SERPL-SCNC: 4.3 MMOL/L (ref 3.4–5.3)
PROT SERPL-MCNC: 6.7 G/DL (ref 6.4–8.3)
RBC # BLD AUTO: 4.78 10E6/UL (ref 3.8–5.2)
SODIUM SERPL-SCNC: 139 MMOL/L (ref 135–145)
TRIGL SERPL-MCNC: 169 MG/DL
TSH SERPL DL<=0.005 MIU/L-ACNC: 2.09 UIU/ML (ref 0.3–4.2)
WBC # BLD AUTO: 7.3 10E3/UL (ref 4–11)

## 2024-09-13 PROCEDURE — 90471 IMMUNIZATION ADMIN: CPT | Performed by: FAMILY MEDICINE

## 2024-09-13 PROCEDURE — 99396 PREV VISIT EST AGE 40-64: CPT | Mod: 25 | Performed by: FAMILY MEDICINE

## 2024-09-13 PROCEDURE — 36415 COLL VENOUS BLD VENIPUNCTURE: CPT | Performed by: FAMILY MEDICINE

## 2024-09-13 PROCEDURE — 85027 COMPLETE CBC AUTOMATED: CPT | Performed by: FAMILY MEDICINE

## 2024-09-13 PROCEDURE — 80061 LIPID PANEL: CPT | Performed by: FAMILY MEDICINE

## 2024-09-13 PROCEDURE — 84443 ASSAY THYROID STIM HORMONE: CPT | Performed by: FAMILY MEDICINE

## 2024-09-13 PROCEDURE — 90656 IIV3 VACC NO PRSV 0.5 ML IM: CPT | Performed by: FAMILY MEDICINE

## 2024-09-13 PROCEDURE — 80053 COMPREHEN METABOLIC PANEL: CPT | Performed by: FAMILY MEDICINE

## 2024-09-13 PROCEDURE — 77063 BREAST TOMOSYNTHESIS BI: CPT | Mod: TC | Performed by: RADIOLOGY

## 2024-09-13 PROCEDURE — 72040 X-RAY EXAM NECK SPINE 2-3 VW: CPT | Mod: TC | Performed by: RADIOLOGY

## 2024-09-13 PROCEDURE — 77067 SCR MAMMO BI INCL CAD: CPT | Mod: TC | Performed by: RADIOLOGY

## 2024-09-13 ASSESSMENT — PATIENT HEALTH QUESTIONNAIRE - PHQ9
SUM OF ALL RESPONSES TO PHQ QUESTIONS 1-9: 7
SUM OF ALL RESPONSES TO PHQ QUESTIONS 1-9: 7
10. IF YOU CHECKED OFF ANY PROBLEMS, HOW DIFFICULT HAVE THESE PROBLEMS MADE IT FOR YOU TO DO YOUR WORK, TAKE CARE OF THINGS AT HOME, OR GET ALONG WITH OTHER PEOPLE: SOMEWHAT DIFFICULT

## 2024-09-13 ASSESSMENT — PAIN SCALES - GENERAL: PAINLEVEL: NO PAIN (0)

## 2024-09-13 NOTE — PROGRESS NOTES
Preventive Care Visit  [facility]  LARRY PERRIN MD ANNA, Family Medicine  Sep 13, 2024      1. Adult general medical exam  This is a 45 yo female here for physical exam - check labs -   - TSH; Future  - Lipid Profile (Chol, Trig, HDL, LDL calc); Future  - Comprehensive metabolic panel (BMP + Alb, Alk Phos, ALT, AST, Total. Bili, TP); Future  - CBC with platelets; Future  - TSH  - Lipid Profile (Chol, Trig, HDL, LDL calc)  - Comprehensive metabolic panel (BMP + Alb, Alk Phos, ALT, AST, Total. Bili, TP)  - CBC with platelets    2. Visit for screening mammogram  Due for breast cancer screening - discussed - already done today     3. Syncope, unspecified syncope type  Patient had recent syncopal event - had donated plasma, didn't eat; then, was standing up in kitchen and had a syncopal episode.  Hit back of neck and lower head on a plastic bucket, denting the bucket.  Still having pain in neck and head - will check Ct scan of head, xray of neck.  Discussed with patient - no neurologic red flags.    - CT Head w/o & w Contrast; Future    4. Chronic nonintractable headache, unspecified headache type  Patient with headache - since syncopal episode - will check C-spine and CT- head.    - XR Cervical Spine 2/3 Views; Future  - CT Head w/o & w Contrast; Future    5. Need for immunization against influenza  Due for seasonal flu vaccine - ordered   - INFLUENZA VACCINE,SPLIT VIRUS,TRIVALENT,PF(FLUZONE)    6. Severe episode of recurrent major depressive disorder, without psychotic features (H)  Patient with depression - lost both her parents in last couple years - felt very sad - but now has reunited with a sister she didn't know she had.  Had recent reunion - this helped to make her happy!      2/19/2024     8:37 PM 6/18/2024     8:34 AM 9/13/2024     8:54 AM   PHQ   PHQ-9 Total Score 10 12 7   Q9: Thoughts of better off dead/self-harm past 2 weeks Not at all Not at all Not at all         7.  Morbid obesity (H)  Body mass index is 48.05 kg/m .  Discussed diet/exercise        Subjective   Sejal is a 44 year old, presenting for the following:  Physical and Dizziness (Not sure if it's from a fall 09/06/24 after donated plasma)        9/13/2024     9:12 AM   Additional Questions   Roomed by Dayami         9/13/2024   Forms   Any forms needing to be completed Yes           Health Care Directive  Patient does not have a Health Care Directive or Living Will: Discussed advance care planning with patient; information given to patient to review.    Here for annual exam   Last Friday night - donated plasma  Didn't eat afterwards -   Was in process of making dinner - passed out and hit her head -   Dented a 2 gallon bucket with her head  Ambulance was there - they didn't bring her in -   Went to ER on Monday - too long a wait    Has been doing okay - but still having headaches -   Pain is in back of head/neck     Was out for 30 seconds -   Woke up delirious  Had urinary incontinence at that time     Had mammogram today  Neck pulls/throbs              9/13/2024   General Health   How would you rate your overall physical health? Good   Feel stress (tense, anxious, or unable to sleep) To some extent      (!) STRESS CONCERN      9/13/2024   Nutrition   Three or more servings of calcium each day? (!) NO   Diet: Regular (no restrictions)   How many servings of fruit and vegetables per day? (!) 2-3   How many sweetened beverages each day? (!) 3            9/13/2024   Exercise   Days per week of moderate/strenous exercise 5 days   Average minutes spent exercising at this level 30 min            9/13/2024   Social Factors   Frequency of gathering with friends or relatives Once a week   Worry food won't last until get money to buy more No   Food not last or not have enough money for food? No   Do you have housing? (Housing is defined as stable permanent housing and does not include staying ouside in a car, in a tent, in an  abandoned building, in an overnight shelter, or couch-surfing.) Yes   Are you worried about losing your housing? No   Lack of transportation? No   Unable to get utilities (heat,electricity)? No            9/13/2024   Dental   Dentist two times every year? Yes            9/13/2024   TB Screening   Were you born outside of the [place]? No          Today's PHQ-9 Score:       9/13/2024     8:54 AM   PHQ-9 SCORE   PHQ-9 Total Score MyChart 7 (Mild depression)   PHQ-9 Total Score 7         9/13/2024   Substance Use   Alcohol more than 3/day or more than 7/wk No   Do you use any other substances recreationally? No        Social History     Tobacco Use    Smoking status: Every Day     Current packs/day: 1.00     Average packs/day: 1 pack/day for 15.0 years (15.0 ttl pk-yrs)     Types: Cigarettes    Smokeless tobacco: Never    Tobacco comments:     half to almost a pack a day   Substance Use Topics    Alcohol use: Never    Drug use: Never           9/11/2023   LAST FHS-7 RESULTS   1st degree relative breast or ovarian cancer No   Any relative bilateral breast cancer No   Any male have breast cancer No   Any ONE woman have BOTH breast AND ovarian cancer Yes   Any woman with breast cancer before 50yrs Yes   2 or more relatives with breast AND/OR ovarian cancer No   2 or more relatives with breast AND/OR bowel cancer No           Mammogram Screening - Mammogram every 1-2 years updated in Health Maintenance based on mutual decision making        9/13/2024   STI Screening   New sexual partner(s) since last STI/HIV test? No        History of abnormal Pap smear: No - age 30- 64 PAP with HPV every 5 years recommended        Latest Ref Rng & Units 9/11/2023    10:30 AM 2/19/2018    12:09 PM   PAP / HPV   PAP  Negative for Intraepithelial Lesion or Malignancy (NILM)  Negative for squamous intraepithelial lesion or malignancy  Electronically signed by Nelly Liang CT (ASCP) on 2/22/2018 at  1:52 PM      HPV 16 DNA Negative Negative   Negative    HPV 18 DNA Negative Negative  Negative    Other HR HPV Negative Negative  Negative      ASCVD Risk   The 10-year ASCVD risk score (Kem ENGLISH, et al., 2019) is: 2.8%    Values used to calculate the score:      Age: 44 years      Sex: Female      Is Non- : No      Diabetic: No      Tobacco smoker: Yes      Systolic Blood Pressure: 129 mmHg      Is BP treated: No      HDL Cholesterol: 35 mg/dL      Total Cholesterol: 133 mg/dL        2024   Contraception/Family Planning   Questions about contraception or family planning No           Reviewed and updated as needed this visit by Provider                    Past Medical History:   Diagnosis Date    Anovulation     History of anesthesia complications     Some gasping after gallbaldder surgery    Obesity     Tobacco abuse      Past Surgical History:   Procedure Laterality Date    CHOLECYSTECTOMY      NE LAP,TUBAL CAUTERY Bilateral 10/10/2019    Procedure: LAPAROSCOPIC BILATERAL SALPINGECTOMY, LEFT OVARIAN CYSTECTOMY;  Surgeon: Angela Means MD;  Location: [facility];  Service: Gynecology     OB History    Para Term  AB Living   1 1 1 0 0 0   SAB IAB Ectopic Multiple Live Births   0 0 0 0 0      # Outcome Date GA Lbr Anjel/2nd Weight Sex Type Anes PTL Lv   1 Term              BP Readings from Last 3 Encounters:   24 129/66   24 130/82   23 126/81    Wt Readings from Last 3 Encounters:   24 135.6 kg (299 lb)   24 131.5 kg (290 lb)   23 128.8 kg (284 lb)                  Patient Active Problem List   Diagnosis    Female infertility associated with anovulation    GA (granuloma annulare)    Herpes simplex type 2 infection    Tobacco use disorder    Morbid obesity (H)    Trouble in sleeping    Itchy skin - right breast    Mirena IUD (expires 10/2018)    Contraception management    Mild episode of recurrent major depressive disorder (H24)    Severe episode of recurrent  major depressive disorder, without psychotic features (H)     Past Surgical History:   Procedure Laterality Date    CHOLECYSTECTOMY      [place] LAP,TUBAL CAUTERY Bilateral 10/10/2019    Procedure: LAPAROSCOPIC BILATERAL SALPINGECTOMY, LEFT OVARIAN CYSTECTOMY;  Surgeon: Angela Means MD;  Location: [facility];  Service: Gynecology       Social History     Tobacco Use    Smoking status: Every Day     Current packs/day: 1.00     Average packs/day: 1 pack/day for 15.0 years (15.0 ttl pk-yrs)     Types: Cigarettes    Smokeless tobacco: Never    Tobacco comments:     half to almost a pack a day   Substance Use Topics    Alcohol use: Never     Family History   Problem Relation Age of Onset    Heart Disease Mother     Prostate Cancer Father     Hypertension Father     Stomach Cancer Father         mets to kidney, lungs, liver    Breast Cancer Cousin         45 - 55, patient unsure exact age and if one breast or both    Ovarian Cancer No family hx of     Colon Cancer No family hx of          Current Outpatient Medications   Medication Sig Dispense Refill    acetaminophen (TYLENOL) 500 MG tablet Take 500-1,000 mg by mouth every 6 hours as needed for mild pain      albuterol (PROAIR HFA/PROVENTIL HFA/VENTOLIN HFA) 108 (90 Base) MCG/ACT inhaler INHALE 2 PUFFS INTO THE LUNGS EVERY 4 HOURS AS NEEDED FOR SHORTNESS OF BREATH OR WHEEZING 8.5 g 4    cyclobenzaprine (FLEXERIL) 5 MG tablet TAKE 1 TABLET (5 MG) BY MOUTH NIGHTLY AS NEEDED FOR MUSCLE SPASMS 30 tablet 0    FLUoxetine (PROZAC) 40 MG capsule TAKE 1 CAPSULE BY MOUTH EVERY DAY 90 capsule 0    hydrOXYzine (VISTARIL) 50 MG capsule Take 1 capsule (50 mg) by mouth 3 times daily as needed for anxiety 60 capsule 3    rOPINIRole (REQUIP) 0.5 MG tablet TAKE 1-2 TABLETS BY MOUTH AT BEDTIME FOR RESTLESS LEGS 60 tablet 3    benzonatate (TESSALON) 100 MG capsule Take 1 capsule (100 mg) by mouth 3 times daily as needed for cough (Patient not taking: Reported on 9/13/2024) 30  "capsule 0    metroNIDAZOLE (METROGEL) 0.75 % external gel Apply topically 2 times daily (Patient not taking: Reported on 6/18/2024) 45 g 1     Allergies   Allergen Reactions    Richmond Oil Other (See Comments)     Tongue tingling    Black Cicero [Black Cicero Flavor] Hives     Other nuts do not bother patient    Penicillins Hives     Recent Labs   Lab Test 09/13/24  1051 09/11/23  1049 06/15/21  1126 04/12/19  1630 02/19/18  1131 02/19/18  1131   A1C  --   --   --   --   --  5.4   LDL 64 76 87  --   --  62   HDL 35* 36* 34*  --   --  40*   TRIG 169* 85 106  --   --  69   ALT 12 10 11 12   < >  --    CR 0.74 0.74 0.71 0.72  --   --    GFRESTIMATED >90 >90 >60 >60  --   --    GFRESTBLACK  --   --  >60 >60  --   --    POTASSIUM 4.3 4.2 4.4 4.1   < >  --    TSH 2.09 1.64 1.78 1.61  --  2.25    < > = values in this interval not displayed.        Review of Systems   Constitutional:  Negative for chills and fever.   HENT:  Negative for ear pain and sore throat.    Eyes:  Negative for pain and visual disturbance.   Respiratory:  Negative for cough and shortness of breath.    Cardiovascular:  Negative for chest pain and palpitations.   Gastrointestinal:  Negative for abdominal pain and vomiting.   Genitourinary:  Negative for dysuria and hematuria.   Musculoskeletal:  Positive for neck pain. Negative for arthralgias and back pain.   Skin:  Negative for color change and rash.   Neurological:  Positive for syncope. Negative for seizures.   All other systems reviewed and are negative.         Objective    Exam  /66 (BP Location: Left arm, Patient Position: Sitting, Cuff Size: Adult Regular)   Pulse 72   Resp 20   Ht 1.68 m (5' 6.14\")   Wt 135.6 kg (299 lb)   LMP 08/30/2024 (Approximate)   SpO2 96%   BMI 48.05 kg/m     Estimated body mass index is 48.05 kg/m  as calculated from the following:    Height as of this encounter: 1.68 m (5' 6.14\").    Weight as of this encounter: 135.6 kg (299 lb).    Physical Exam  Vitals " reviewed.   Constitutional:       General: She is not in acute distress.     Appearance: Normal appearance.   HENT:      Head: Normocephalic.      Comments: Tender at posterior left occipital scalp  No deformity/no stepoff       Right Ear: Tympanic membrane, ear canal and external ear normal.      Left Ear: Tympanic membrane, ear canal and external ear normal.      Nose: Nose normal.      Mouth/Throat:      Mouth: Mucous membranes are moist.      Pharynx: No posterior oropharyngeal erythema.   Eyes:      Extraocular Movements: Extraocular movements intact.      Conjunctiva/sclera: Conjunctivae normal.      Pupils: Pupils are equal, round, and reactive to light.   Cardiovascular:      Rate and Rhythm: Normal rate and regular rhythm.      Pulses: Normal pulses.      Heart sounds: Normal heart sounds. No murmur heard.  Pulmonary:      Effort: Pulmonary effort is normal.      Breath sounds: Normal breath sounds.   Abdominal:      Palpations: Abdomen is soft. There is no mass.      Tenderness: There is no abdominal tenderness. There is no guarding or rebound.   Musculoskeletal:         General: No deformity. Normal range of motion.      Cervical back: Normal range of motion and neck supple. Tenderness (along left lateral/posterior neck) present.   Lymphadenopathy:      Cervical: No cervical adenopathy.   Skin:     General: Skin is warm and dry.   Neurological:      General: No focal deficit present.      Mental Status: She is alert.   Psychiatric:         Mood and Affect: Mood normal.         Behavior: Behavior normal.           Prior to immunization administration, verified patients identity using patient s name and date of birth. Please see Immunization Activity for additional information.     Screening Questionnaire for Adult Immunization    Are you sick today?   No   Do you have allergies to medications, food, a vaccine component or latex?   Yes   Have you ever had a serious reaction after receiving a vaccination?    No   Do you have a long-term health problem with heart, lung, kidney, or metabolic disease (e.g., diabetes), asthma, a blood disorder, no spleen, complement component deficiency, a cochlear implant, or a spinal fluid leak?  Are you on long-term aspirin therapy?   No   Do you have cancer, leukemia, HIV/AIDS, or any other immune system problem?   No   Do you have a parent, brother, or sister with an immune system problem?   No   In the past 3 months, have you taken medications that affect  your immune system, such as prednisone, other steroids, or anticancer drugs; drugs for the treatment of rheumatoid arthritis, Crohn s disease, or psoriasis; or have you had radiation treatments?   No   Have you had a seizure, or a brain or other nervous system problem?   No   During the past year, have you received a transfusion of blood or blood    products, or been given immune (gamma) globulin or antiviral drug?   No   For women: Are you pregnant or is there a chance you could become       pregnant during the next month?   No   Have you received any vaccinations in the past 4 weeks?   No     Immunization questionnaire was positive for at least one answer.  Notified .      Patient instructed to remain in clinic for 15 minutes afterwards, and to report any adverse reactions.     Screening performed by Dayami Solitario MA on 9/13/2024 at 9:18 AM.         Signed Electronically by: LARRY CASTILLO MD    Answers submitted by the patient for this visit:  Patient Health Questionnaire (Submitted on 9/13/2024)  If you checked off any problems, how difficult have these problems made it for you to do your work, take care of things at home, or get along with other people?: Somewhat difficult  PHQ9 TOTAL SCORE: 7

## 2024-09-15 ASSESSMENT — ENCOUNTER SYMPTOMS
HEMATURIA: 0
FEVER: 0
DYSURIA: 0
BACK PAIN: 0
NECK PAIN: 1
PALPITATIONS: 0
ABDOMINAL PAIN: 0
VOMITING: 0
SHORTNESS OF BREATH: 0
EYE PAIN: 0
COLOR CHANGE: 0
ARTHRALGIAS: 0
SEIZURES: 0
CHILLS: 0
SORE THROAT: 0
COUGH: 0

## 2024-09-17 DIAGNOSIS — R55 SYNCOPE, UNSPECIFIED SYNCOPE TYPE: Primary | ICD-10-CM

## 2024-09-17 DIAGNOSIS — G89.29 CHRONIC NONINTRACTABLE HEADACHE, UNSPECIFIED HEADACHE TYPE: ICD-10-CM

## 2024-09-17 DIAGNOSIS — R51.9 CHRONIC NONINTRACTABLE HEADACHE, UNSPECIFIED HEADACHE TYPE: ICD-10-CM

## 2024-09-20 DIAGNOSIS — J98.01 BRONCHOSPASM: ICD-10-CM

## 2024-09-21 ENCOUNTER — HOSPITAL ENCOUNTER (OUTPATIENT)
Dept: CT IMAGING | Facility: HOSPITAL | Age: 44
Discharge: HOME | End: 2024-09-21
Attending: FAMILY MEDICINE | Admitting: FAMILY MEDICINE
Payer: COMMERCIAL

## 2024-09-21 ENCOUNTER — MYC MEDICAL ADVICE (OUTPATIENT)
Dept: FAMILY MEDICINE | Facility: CLINIC | Age: 44
End: 2024-09-21
Payer: COMMERCIAL

## 2024-09-21 DIAGNOSIS — R51.9 CHRONIC NONINTRACTABLE HEADACHE, UNSPECIFIED HEADACHE TYPE: ICD-10-CM

## 2024-09-21 DIAGNOSIS — R51.9 CHRONIC NONINTRACTABLE HEADACHE, UNSPECIFIED HEADACHE TYPE: Primary | ICD-10-CM

## 2024-09-21 DIAGNOSIS — R55 SYNCOPE, UNSPECIFIED SYNCOPE TYPE: ICD-10-CM

## 2024-09-21 DIAGNOSIS — G89.29 CHRONIC NONINTRACTABLE HEADACHE, UNSPECIFIED HEADACHE TYPE: ICD-10-CM

## 2024-09-21 DIAGNOSIS — G89.29 CHRONIC NONINTRACTABLE HEADACHE, UNSPECIFIED HEADACHE TYPE: Primary | ICD-10-CM

## 2024-09-21 PROCEDURE — 70450 CT HEAD/BRAIN W/O DYE: CPT

## 2024-09-22 RX ORDER — ALBUTEROL SULFATE 90 UG/1
2 AEROSOL, METERED RESPIRATORY (INHALATION) EVERY 4 HOURS PRN
Qty: 8.5 G | Refills: 4 | Status: SHIPPED | OUTPATIENT
Start: 2024-09-22

## 2024-09-24 NOTE — TELEPHONE ENCOUNTER
"Patient calling to follow-up on [employer] message.     Patient wonders if Dr. Garcia can prescribe something for pain relief, patient willing to schedule follow-up visit if needed--please advise.    Seen 9/13/24 for neck pain related to syncopal event/fall.  9/13 c-spine XR showed muscle spasm, \"mild reveral of usual cervical lordosis\".  9/21 head CT negative  Neck pain has not improved since 9/13 visit.  Rates pain 5-6/10  Neck  is \"sore and stiff\"  Difficulty sleeping  Pain aggravated when turning head.  Causes headaches--pain located at point of impact, radiates to forehead.  Patient has tried:  Alternating heat/ice--minimal relief  CBD oil--works for a little while, wears off and pain returns.  Tiger Balm--localized reaction of heat and redness.  Tylenol  Massage  Advised patient:  Try ibuprofen OTC for pain relief  Consider lidoderm patches if appropriate.  Triage will return call to patient with PCP recommendations, OK to LDM at mobile #.    Kenisha Crain RN  [facility]  "

## 2024-10-04 RX ORDER — MELOXICAM 15 MG/1
15 TABLET ORAL DAILY PRN
Qty: 30 TABLET | Refills: 0 | Status: SHIPPED | OUTPATIENT
Start: 2024-10-04

## 2024-12-26 DIAGNOSIS — S39.012A STRAIN OF LUMBAR REGION, INITIAL ENCOUNTER: ICD-10-CM

## 2024-12-26 RX ORDER — CYCLOBENZAPRINE HCL 5 MG
5 TABLET ORAL
Qty: 30 TABLET | Refills: 0 | Status: SHIPPED | OUTPATIENT
Start: 2024-12-26

## 2025-02-17 ENCOUNTER — ANCILLARY PROCEDURE (OUTPATIENT)
Dept: GENERAL RADIOLOGY | Facility: CLINIC | Age: 45
End: 2025-02-17
Attending: FAMILY MEDICINE
Payer: COMMERCIAL

## 2025-02-17 ENCOUNTER — OFFICE VISIT (OUTPATIENT)
Dept: FAMILY MEDICINE | Facility: CLINIC | Age: 45
End: 2025-02-17
Payer: COMMERCIAL

## 2025-02-17 VITALS
TEMPERATURE: 97.1 F | BODY MASS INDEX: 45.99 KG/M2 | RESPIRATION RATE: 21 BRPM | WEIGHT: 293 LBS | HEART RATE: 88 BPM | DIASTOLIC BLOOD PRESSURE: 75 MMHG | SYSTOLIC BLOOD PRESSURE: 128 MMHG | HEIGHT: 67 IN | OXYGEN SATURATION: 99 %

## 2025-02-17 DIAGNOSIS — M54.42 ACUTE LEFT-SIDED LOW BACK PAIN WITH LEFT-SIDED SCIATICA: ICD-10-CM

## 2025-02-17 DIAGNOSIS — M54.42 ACUTE LEFT-SIDED LOW BACK PAIN WITH LEFT-SIDED SCIATICA: Primary | ICD-10-CM

## 2025-02-17 PROCEDURE — 72100 X-RAY EXAM L-S SPINE 2/3 VWS: CPT | Mod: TC | Performed by: RADIOLOGY

## 2025-02-17 PROCEDURE — 99213 OFFICE O/P EST LOW 20 MIN: CPT | Performed by: FAMILY MEDICINE

## 2025-02-17 RX ORDER — PREDNISONE 20 MG/1
40 TABLET ORAL DAILY
Qty: 10 TABLET | Refills: 0 | Status: SHIPPED | OUTPATIENT
Start: 2025-02-17 | End: 2025-02-22

## 2025-02-17 NOTE — PROGRESS NOTES
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  She is not taking prescribed medications regularly due to remembering to take.       {MA/LPN/RN Pre-Provider Visit Orders- hCG/UA/Strep (Optional):916016}  {SUPERLIST (Optional):339452}  {additonal problems for provider to add (Optional):899199}    {ROS Picklists (Optional):803783}      Objective    /75 (BP Location: Right arm, Patient Position: Sitting, Cuff Size: Adult Large)   Pulse 88   Temp 97.1  F (36.2  C) (Temporal)   Resp 21   Ht 1.7 m (5' 6.93\")   Wt 134.3 kg (296 lb)   LMP 02/13/2025   SpO2 99%   BMI 46.46 kg/m    Body mass index is 46.46 kg/m .  Physical Exam   {Exam List (Optional):760384}    {Diagnostic Test Results (Optional):652718}        Signed Electronically by: LARRY CASTILLO MD  {Email feedback regarding this note to [email or web address]   :229890}  " "  Musculoskeletal:  Positive for back pain. Negative for arthralgias.   Skin:  Negative for color change and rash.   Neurological:  Negative for seizures and syncope.   All other systems reviewed and are negative.          Objective    /75 (BP Location: Right arm, Patient Position: Sitting, Cuff Size: Adult Large)   Pulse 88   Temp 97.1  F (36.2  C) (Temporal)   Resp 21   Ht 1.7 m (5' 6.93\")   Wt 134.3 kg (296 lb)   LMP 02/13/2025   SpO2 99%   BMI 46.46 kg/m    Body mass index is 46.46 kg/m .  Physical Exam  Vitals reviewed.   Constitutional:       General: She is not in acute distress.     Appearance: Normal appearance.   HENT:      Head: Normocephalic.      Right Ear: Tympanic membrane, ear canal and external ear normal.      Left Ear: Tympanic membrane, ear canal and external ear normal.      Nose: Nose normal.      Mouth/Throat:      Mouth: Mucous membranes are moist.      Pharynx: No posterior oropharyngeal erythema.   Eyes:      Extraocular Movements: Extraocular movements intact.      Conjunctiva/sclera: Conjunctivae normal.      Pupils: Pupils are equal, round, and reactive to light.   Cardiovascular:      Rate and Rhythm: Normal rate and regular rhythm.      Pulses: Normal pulses.      Heart sounds: Normal heart sounds. No murmur heard.  Pulmonary:      Effort: Pulmonary effort is normal.      Breath sounds: Normal breath sounds.   Abdominal:      Palpations: Abdomen is soft. There is no mass.      Tenderness: There is no abdominal tenderness. There is no guarding or rebound.   Musculoskeletal:         General: Tenderness (tenderness to palpation at lower lumbar/sacral paraspinal muscles) present. No deformity. Normal range of motion.      Cervical back: Normal range of motion and neck supple.   Lymphadenopathy:      Cervical: No cervical adenopathy.   Skin:     General: Skin is warm and dry.   Neurological:      General: No focal deficit present.      Mental Status: She is alert. "   Psychiatric:         Mood and Affect: Mood normal.         Behavior: Behavior normal.            Results for orders placed or performed in visit on 02/17/25   XR Lumbar Spine 2/3 Views     Status: None    Narrative    EXAM: XR LUMBAR SPINE 2/3 VIEWS  LOCATION: [facility]  DATE: 2/17/2025    INDICATION: low back pain   with radiation to elft leg  COMPARISON: None.      Impression    IMPRESSION: Normal vertebral body heights and alignment. There is mild intervertebral disc height loss, endplate degenerative changes, as well as bilateral facet arthropathy at L5-S1. Right upper quadrant surgical clips noted.           Signed Electronically by: LARRY CASTILLO MD

## 2025-02-17 NOTE — PROGRESS NOTES
Prior to immunization administration, verified patients identity using patient s name and date of birth. Please see Immunization Activity for additional information.     Screening Questionnaire for Adult Immunization    Are you sick today?   No   Do you have allergies to medications, food, a vaccine component or latex?   Yes   Have you ever had a serious reaction after receiving a vaccination?   No   Do you have a long-term health problem with heart, lung, kidney, or metabolic disease (e.g., diabetes), asthma, a blood disorder, no spleen, complement component deficiency, a cochlear implant, or a spinal fluid leak?  Are you on long-term aspirin therapy?   No   Do you have cancer, leukemia, HIV/AIDS, or any other immune system problem?   No   Do you have a parent, brother, or sister with an immune system problem?   No   In the past 3 months, have you taken medications that affect  your immune system, such as prednisone, other steroids, or anticancer drugs; drugs for the treatment of rheumatoid arthritis, Crohn s disease, or psoriasis; or have you had radiation treatments?   No   Have you had a seizure, or a brain or other nervous system problem?   No   During the past year, have you received a transfusion of blood or blood    products, or been given immune (gamma) globulin or antiviral drug?   No   For women: Are you pregnant or is there a chance you could become       pregnant during the next month?   No   Have you received any vaccinations in the past 4 weeks?   No     Immunization questionnaire was positive for at least one answer.  Notified .      Patient instructed to remain in clinic for 15 minutes afterwards, and to report any adverse reactions.     Screening performed by ROBIN Ortega MA on 2/17/2025 at 8:49 AM.   Answers submitted by the patient for this visit:  Patient Health Questionnaire (Submitted on 2/17/2025)  If you checked off any problems, how difficult have these problems made it for you to do  your work, take care of things at home, or get along with other people?: Somewhat difficult  PHQ9 TOTAL SCORE: 7  General Questionnaire (Submitted on 2/17/2025)  Chief Complaint: Chronic problems general questions HPI Form  What is the reason for your visit today? : Lower back pain  How many days per week do you miss taking your medication?: 2  What makes it hard for you to take your medication every day?: remembering to take  Questionnaire about: Chronic problems general questions HPI Form (Submitted on 2/17/2025)  Chief Complaint: Chronic problems general questions HPI Form

## 2025-02-23 ASSESSMENT — ENCOUNTER SYMPTOMS
BACK PAIN: 1
EYE PAIN: 0
SORE THROAT: 0
SEIZURES: 0
COLOR CHANGE: 0
PALPITATIONS: 0
VOMITING: 0
COUGH: 0
HEMATURIA: 0
CHILLS: 0
ARTHRALGIAS: 0
DYSURIA: 0
FEVER: 0
SHORTNESS OF BREATH: 0
ABDOMINAL PAIN: 0

## 2025-04-04 DIAGNOSIS — J98.01 BRONCHOSPASM: ICD-10-CM

## 2025-04-06 RX ORDER — ALBUTEROL SULFATE 90 UG/1
2 INHALANT RESPIRATORY (INHALATION) EVERY 4 HOURS PRN
Qty: 18 G | Refills: 1 | Status: SHIPPED | OUTPATIENT
Start: 2025-04-06

## 2025-04-09 ENCOUNTER — OFFICE VISIT (OUTPATIENT)
Dept: FAMILY MEDICINE | Facility: CLINIC | Age: 45
End: 2025-04-09
Payer: COMMERCIAL

## 2025-04-09 VITALS
RESPIRATION RATE: 18 BRPM | WEIGHT: 293 LBS | BODY MASS INDEX: 45.99 KG/M2 | TEMPERATURE: 98.1 F | HEART RATE: 79 BPM | HEIGHT: 67 IN | SYSTOLIC BLOOD PRESSURE: 131 MMHG | DIASTOLIC BLOOD PRESSURE: 84 MMHG | OXYGEN SATURATION: 99 %

## 2025-04-09 DIAGNOSIS — R20.2 NUMBNESS AND TINGLING IN BOTH HANDS: ICD-10-CM

## 2025-04-09 DIAGNOSIS — L02.818 CUTANEOUS ABSCESS OF OTHER SITE: Primary | ICD-10-CM

## 2025-04-09 DIAGNOSIS — R20.0 NUMBNESS AND TINGLING IN BOTH HANDS: ICD-10-CM

## 2025-04-09 PROCEDURE — 3075F SYST BP GE 130 - 139MM HG: CPT | Performed by: FAMILY MEDICINE

## 2025-04-09 PROCEDURE — 87077 CULTURE AEROBIC IDENTIFY: CPT | Performed by: FAMILY MEDICINE

## 2025-04-09 PROCEDURE — 3079F DIAST BP 80-89 MM HG: CPT | Performed by: FAMILY MEDICINE

## 2025-04-09 PROCEDURE — 87186 SC STD MICRODIL/AGAR DIL: CPT | Performed by: FAMILY MEDICINE

## 2025-04-09 PROCEDURE — 87070 CULTURE OTHR SPECIMN AEROBIC: CPT | Performed by: FAMILY MEDICINE

## 2025-04-09 PROCEDURE — 99213 OFFICE O/P EST LOW 20 MIN: CPT | Performed by: FAMILY MEDICINE

## 2025-04-09 RX ORDER — CEPHALEXIN 500 MG/1
500 CAPSULE ORAL 2 TIMES DAILY
Qty: 14 CAPSULE | Refills: 0 | Status: SHIPPED | OUTPATIENT
Start: 2025-04-09 | End: 2025-04-16

## 2025-04-09 ASSESSMENT — PATIENT HEALTH QUESTIONNAIRE - PHQ9
SUM OF ALL RESPONSES TO PHQ QUESTIONS 1-9: 6
10. IF YOU CHECKED OFF ANY PROBLEMS, HOW DIFFICULT HAVE THESE PROBLEMS MADE IT FOR YOU TO DO YOUR WORK, TAKE CARE OF THINGS AT HOME, OR GET ALONG WITH OTHER PEOPLE: SOMEWHAT DIFFICULT
SUM OF ALL RESPONSES TO PHQ QUESTIONS 1-9: 6

## 2025-04-09 ASSESSMENT — ENCOUNTER SYMPTOMS: NUMBNESS: 1

## 2025-04-09 NOTE — PROGRESS NOTES
Prior to immunization administration, verified patients identity using patient s name and date of birth. Please see Immunization Activity for additional information.     Screening Questionnaire for Adult Immunization    Are you sick today?   No   Do you have allergies to medications, food, a vaccine component or latex?   Yes   Have you ever had a serious reaction after receiving a vaccination?   No   Do you have a long-term health problem with heart, lung, kidney, or metabolic disease (e.g., diabetes), asthma, a blood disorder, no spleen, complement component deficiency, a cochlear implant, or a spinal fluid leak?  Are you on long-term aspirin therapy?   No   Do you have cancer, leukemia, HIV/AIDS, or any other immune system problem?   No   Do you have a parent, brother, or sister with an immune system problem?   No   In the past 3 months, have you taken medications that affect  your immune system, such as prednisone, other steroids, or anticancer drugs; drugs for the treatment of rheumatoid arthritis, Crohn s disease, or psoriasis; or have you had radiation treatments?   No   Have you had a seizure, or a brain or other nervous system problem?   No   During the past year, have you received a transfusion of blood or blood    products, or been given immune (gamma) globulin or antiviral drug?   No   For women: Are you pregnant or is there a chance you could become       pregnant during the next month?   No   Have you received any vaccinations in the past 4 weeks?   No     Immunization questionnaire was positive for at least one answer.  Notified provider.      Patient instructed to remain in clinic for 15 minutes afterwards, and to report any adverse reactions.     Screening performed by Jordan Martino MA on 4/9/2025 at 1:27 PM.       Answers submitted by the patient for this visit:  Patient Health Questionnaire (Submitted on 4/9/2025)  If you checked off any problems, how difficult have these problems made it for  you to do your work, take care of things at home, or get along with other people?: Somewhat difficult  PHQ9 TOTAL SCORE: 6  General Questionnaire (Submitted on 4/9/2025)  Chief Complaint: Chronic problems general questions HPI Form  How many days per week do you miss taking your medication?: 0  General Concern (Submitted on 4/9/2025)  Chief Complaint: Chronic problems general questions HPI Form  What is the reason for your visit today?: new issue  When did your symptoms begin?: 3-7 days ago  What are your symptoms?: bump  How would you describe these symptoms?: Moderate  Are your symptoms:: Improving  Have you had these symptoms before?: No  Questionnaire about: Chronic problems general questions HPI Form (Submitted on 4/9/2025)  Chief Complaint: Chronic problems general questions HPI Form

## 2025-04-09 NOTE — PROGRESS NOTES
"  {PROVIDER CHARTING PREFERENCE:947863}    Subjective   Sejal is a 44 year old, presenting for the following health issues:  Breast Pain (Bump on the right side of the breast/ pt stated that it painful and sore ) and Numbness (Numbness/ tingling in both hands/ mole on the neck )        4/9/2025     1:25 PM   Additional Questions   Roomed by hser   Accompanied by self     1.  Back pain - better    2.  Lump on breast - outside - right breast    3.  Looking at screen - vision goes \"out\" - seems blurry at first, then goes into focus   Has always had 20/25 vision   Will make eye appointment     4.  More tingling in fingers - always had carpal tunnel in left hand  Some prickling in legs/abdomen  Driving - feels like there's a spasm and someone is poking with a needle   Taking RLS meds at night, doesn't kick people any more    5.  Can't hold bladder anymore - some leaking   Did have tear at delivery - held baby in while waiting for doctor to come in         History of Present Illness       Reason for visit:  New issue  Symptom onset:  3-7 days ago  Symptoms include:  Bump  Symptom intensity:  Moderate  Symptom progression:  Improving  Had these symptoms before:  No   She is taking medications regularly.        {MA/LPN/RN Pre-Provider Visit Orders- hCG/UA/Strep (Optional):731757}  {SUPERLIST (Optional):480275}  {additonal problems for provider to add (Optional):517775}    {ROS Picklists (Optional):979670}      Objective    /84 (BP Location: Right arm, Patient Position: Sitting, Cuff Size: Adult Large)   Pulse 79   Temp 98.1  F (36.7  C) (Temporal)   Resp 18   Ht 1.7 m (5' 6.93\")   Wt 135.5 kg (298 lb 12.8 oz)   LMP 03/10/2025 (Exact Date)   SpO2 99%   BMI 46.90 kg/m    Body mass index is 46.9 kg/m .  Physical Exam   {Exam List (Optional):319802}    {Diagnostic Test Results (Optional):494082}        Signed Electronically by: LARRY CASTILLO MD  {Email feedback regarding this note to " [email or web address]   :[id number]}   Normal range of motion and neck supple.   Lymphadenopathy:      Cervical: No cervical adenopathy.   Skin:     General: Skin is warm and dry.   Neurological:      General: No focal deficit present.      Mental Status: She is alert.   Psychiatric:         Mood and Affect: Mood normal.         Behavior: Behavior normal.          Results for orders placed or performed in visit on 04/09/25   Skin Aerobic Bacterial Culture Routine Without Gram Stain     Status: Abnormal    Specimen: Breast, Right Upper Outer; Skin   Result Value Ref Range    Culture 3+ Staphylococcus lugdunensis (A)        Susceptibility    Staphylococcus lugdunensis - HERMINIO     Oxacillin* 2 Susceptible ug/mL      * Oxacillin susceptible isolates are susceptible to cephalosporins (example: cefazolin and cephalexin) and beta lactam combination agents. Oxacillin resistant isolates are resistant to these agents.     Gentamicin <=0.5 Susceptible ug/mL     Erythromycin <=0.25 Susceptible ug/mL     Clindamycin 0.25 Susceptible ug/mL     Vancomycin <=0.5 Susceptible ug/mL     Daptomycin 0.25 Susceptible ug/mL     Tetracycline <=1 Susceptible ug/mL     Doxycycline <=0.5 Susceptible ug/mL     Trimethoprim/Sulfamethoxazole <=0.5/9.5 Susceptible ug/mL           Signed Electronically by: LARRY CASTILLO MD

## 2025-04-10 LAB
BACTERIA SKIN AEROBE CULT: ABNORMAL
BACTERIA SKIN AEROBE CULT: ABNORMAL

## 2025-04-11 LAB — BACTERIA SKIN AEROBE CULT: ABNORMAL

## 2025-04-27 ASSESSMENT — ENCOUNTER SYMPTOMS
WOUND: 1
SHORTNESS OF BREATH: 0
CHILLS: 0
BACK PAIN: 0
PALPITATIONS: 0
COUGH: 0
HEMATURIA: 0
SORE THROAT: 0
COLOR CHANGE: 0
SEIZURES: 0
EYE PAIN: 0
ABDOMINAL PAIN: 0
DYSURIA: 0
VOMITING: 0
ARTHRALGIAS: 0
FEVER: 0

## 2025-05-03 DIAGNOSIS — G25.81 RESTLESS LEGS SYNDROME: ICD-10-CM

## 2025-05-03 RX ORDER — ROPINIROLE 0.5 MG/1
TABLET, FILM COATED ORAL
Qty: 60 TABLET | Refills: 9 | Status: SHIPPED | OUTPATIENT
Start: 2025-05-03

## 2025-07-03 DIAGNOSIS — J98.01 BRONCHOSPASM: ICD-10-CM

## 2025-07-03 DIAGNOSIS — L02.818 CUTANEOUS ABSCESS OF OTHER SITE: ICD-10-CM

## 2025-07-06 RX ORDER — CEPHALEXIN 500 MG/1
CAPSULE ORAL
Qty: 14 CAPSULE | Refills: 0 | Status: SHIPPED | OUTPATIENT
Start: 2025-07-06

## 2025-07-06 RX ORDER — ALBUTEROL SULFATE 90 UG/1
2 INHALANT RESPIRATORY (INHALATION) EVERY 4 HOURS PRN
Qty: 6.7 G | Refills: 1 | Status: SHIPPED | OUTPATIENT
Start: 2025-07-06

## 2025-08-14 ENCOUNTER — PATIENT OUTREACH (OUTPATIENT)
Dept: CARE COORDINATION | Facility: CLINIC | Age: 45
End: 2025-08-14
Payer: COMMERCIAL

## 2025-08-16 DIAGNOSIS — S39.012A STRAIN OF LUMBAR REGION, INITIAL ENCOUNTER: ICD-10-CM

## 2025-08-17 RX ORDER — CYCLOBENZAPRINE HCL 5 MG
5 TABLET ORAL
Qty: 30 TABLET | Refills: 0 | Status: SHIPPED | OUTPATIENT
Start: 2025-08-17